# Patient Record
Sex: FEMALE | Race: WHITE | NOT HISPANIC OR LATINO
[De-identification: names, ages, dates, MRNs, and addresses within clinical notes are randomized per-mention and may not be internally consistent; named-entity substitution may affect disease eponyms.]

---

## 2020-06-18 ENCOUNTER — APPOINTMENT (OUTPATIENT)
Dept: GASTROENTEROLOGY | Facility: CLINIC | Age: 25
End: 2020-06-18
Payer: COMMERCIAL

## 2020-06-18 VITALS
BODY MASS INDEX: 19.13 KG/M2 | HEIGHT: 66 IN | TEMPERATURE: 98.7 F | HEART RATE: 74 BPM | OXYGEN SATURATION: 98 % | WEIGHT: 119 LBS | DIASTOLIC BLOOD PRESSURE: 70 MMHG | SYSTOLIC BLOOD PRESSURE: 110 MMHG

## 2020-06-18 DIAGNOSIS — Z83.79 FAMILY HISTORY OF OTHER DISEASES OF THE DIGESTIVE SYSTEM: ICD-10-CM

## 2020-06-18 DIAGNOSIS — Z80.0 FAMILY HISTORY OF MALIGNANT NEOPLASM OF DIGESTIVE ORGANS: ICD-10-CM

## 2020-06-18 DIAGNOSIS — Z83.42 FAMILY HISTORY OF FAMILIAL HYPERCHOLESTEROLEMIA: ICD-10-CM

## 2020-06-18 PROCEDURE — 99204 OFFICE O/P NEW MOD 45 MIN: CPT | Mod: 25

## 2020-06-18 PROCEDURE — 82270 OCCULT BLOOD FECES: CPT

## 2020-06-18 RX ORDER — BACILLUS COAGULANS/INULIN 1B-250 MG
CAPSULE ORAL
Refills: 0 | Status: ACTIVE | COMMUNITY

## 2020-06-18 NOTE — REASON FOR VISIT
[Initial Evaluation] : an initial evaluation [FreeTextEntry1] : Rectal bleeding, constipation, abdominal pain

## 2020-06-18 NOTE — PHYSICAL EXAM
[General Appearance - Alert] : alert [General Appearance - In No Acute Distress] : in no acute distress [Neck Cervical Mass (___cm)] : no neck mass was observed [Neck Appearance] : the appearance of the neck was normal [Jugular Venous Distention Increased] : there was no jugular-venous distention [Thyroid Diffuse Enlargement] : the thyroid was not enlarged [Thyroid Nodule] : there were no palpable thyroid nodules [Auscultation Breath Sounds / Voice Sounds] : lungs were clear to auscultation bilaterally [Heart Rate And Rhythm] : heart rate was normal and rhythm regular [Heart Sounds] : normal S1 and S2 [Heart Sounds Pericardial Friction Rub] : no pericardial rub [Heart Sounds Gallop] : no gallops [Murmurs] : no murmurs [Abdomen Tenderness] : non-tender [Bowel Sounds] : normal bowel sounds [Edema] : there was no peripheral edema [Abdomen Soft] : soft [] : no hepato-splenomegaly [Abdomen Mass (___ Cm)] : no abdominal mass palpated [Normal Sphincter Tone] : normal sphincter tone [No Rectal Mass] : no rectal mass [Occult Blood Positive] : stool positive for occult blood [No CVA Tenderness] : no ~M costovertebral angle tenderness [No Spinal Tenderness] : no spinal tenderness [Oriented To Time, Place, And Person] : oriented to person, place, and time [Impaired Insight] : insight and judgment were intact [Affect] : the affect was normal

## 2020-06-18 NOTE — HISTORY OF PRESENT ILLNESS
[FreeTextEntry1] : The patient is a 25-year-old woman who was well until 2 months ago, when she developed rectal bleeding and constipation.  She states that she began to go 1 to 2 days without a bowel movement and was straining to pass her stools.  She has noted red blood streaking on the stool and on the toilet paper.  She notes that this was during the COVID-19 isolation, when she had a significant change in her exercise and her diet.  She is a  and had significant reduction in her physical activity.  She also had been a vegan but began to eat fish and eggs.  Ever since then, the patient has had blood with every bowel movement.  The patient also notes that for the past month, she has had left-sided abdominal pain which is intermittent.  She denies heartburn, dysphasia, nausea, vomiting.  The patient's weight is stable.\par \par She was put on a stool softener and prune juice initially which did not help her.  She saw a gastroenterologist 1 week ago who recommended a high-fiber diet and placed her on Citrucel 2 tablespoons twice daily.  She has been moving her bowels 1-2 times a day but still had blood on all her bowel movements until today.  The patient's pain has lessened this week.\par \par  The patient has not been admitted to the hospital in the past year and denies any cardiac issues.

## 2020-06-18 NOTE — ASSESSMENT
[FreeTextEntry1] : Patient with new onset constipation and rectal bleeding with associated left-sided abdominal pain.  This appears to correspond to the patient's change in diet with less fiber and decreased physical activity.  The patient is concerned as she has colon cancer in her family.\par \par I advised the patient that she should continue Citrucel and follow a high-fiber diet.  She was also encouraged to increase her fluid intake.\par \par A colonoscopy has been scheduled. The risks, benefits, alternatives, and limitations of the procedure, including the possibility of missed lesions, were explained.

## 2020-06-18 NOTE — CONSULT LETTER
[FreeTextEntry1] : Dear Dr. Chrissie Jamison,\[facility] \[facility] I had the pleasure of seeing your patient ADARSH SAMUEL in the office today.  My office note is attached. PLEASE READ THE "ASSESSMENT" SECTION OF THE NOTE TO SEE MY IMPRESSION AND PLAN.\par \[facility] Thank you very much for allowing me to participate in the care of your patient.\[facility] \[facility] Sincerely,\[facility] \[facility] Ke Peguero M.D., FAC, [facility] Director, Celiac Program at [facility]  of Medicine\[facility] and [employer] at [facility] Practice Director,\[facility] - Gastroenterology/Internal Medicine at [address]\[place], [place] [address] Tel: [phone]\[facility] Email: [email or web address] \[facility] \[facility] The attached note has been created using a voice recognition system (Dragon).  There may be some misspellings and typos.  Please call my office if you have any issues or questions.

## 2020-07-04 ENCOUNTER — APPOINTMENT (OUTPATIENT)
Dept: DISASTER EMERGENCY | Facility: CLINIC | Age: 25
End: 2020-07-04

## 2020-07-04 LAB — SARS-COV-2 N GENE NPH QL NAA+PROBE: NOT DETECTED

## 2020-07-07 ENCOUNTER — APPOINTMENT (OUTPATIENT)
Dept: GASTROENTEROLOGY | Facility: AMBULATORY MEDICAL SERVICES | Age: 25
End: 2020-07-07
Payer: COMMERCIAL

## 2020-07-07 PROCEDURE — 45380 COLONOSCOPY AND BIOPSY: CPT

## 2020-08-20 ENCOUNTER — APPOINTMENT (OUTPATIENT)
Dept: GASTROENTEROLOGY | Facility: AMBULATORY MEDICAL SERVICES | Age: 25
End: 2020-08-20

## 2020-08-21 DIAGNOSIS — Z11.59 ENCOUNTER FOR SCREENING FOR OTHER VIRAL DISEASES: ICD-10-CM

## 2020-08-25 ENCOUNTER — APPOINTMENT (OUTPATIENT)
Dept: GASTROENTEROLOGY | Facility: CLINIC | Age: 25
End: 2020-08-25
Payer: COMMERCIAL

## 2020-08-25 VITALS
SYSTOLIC BLOOD PRESSURE: 110 MMHG | DIASTOLIC BLOOD PRESSURE: 68 MMHG | BODY MASS INDEX: 20.25 KG/M2 | HEIGHT: 66 IN | WEIGHT: 126 LBS | OXYGEN SATURATION: 98 % | HEART RATE: 71 BPM | TEMPERATURE: 98.4 F

## 2020-08-25 PROCEDURE — 36415 COLL VENOUS BLD VENIPUNCTURE: CPT

## 2020-08-25 PROCEDURE — 99214 OFFICE O/P EST MOD 30 MIN: CPT | Mod: 25

## 2020-08-25 RX ORDER — SODIUM PICOSULFATE, MAGNESIUM OXIDE, AND ANHYDROUS CITRIC ACID 10; 3.5; 12 MG/160ML; G/160ML; G/160ML
10-3.5-12 MG-GM LIQUID ORAL
Qty: 1 | Refills: 0 | Status: DISCONTINUED | COMMUNITY
Start: 2020-06-18 | End: 2020-08-25

## 2020-08-25 NOTE — PHYSICAL EXAM
[General Appearance - Alert] : alert [General Appearance - In No Acute Distress] : in no acute distress [Neck Appearance] : the appearance of the neck was normal [Thyroid Diffuse Enlargement] : the thyroid was not enlarged [Neck Cervical Mass (___cm)] : no neck mass was observed [Jugular Venous Distention Increased] : there was no jugular-venous distention [Thyroid Nodule] : there were no palpable thyroid nodules [Heart Rate And Rhythm] : heart rate was normal and rhythm regular [Auscultation Breath Sounds / Voice Sounds] : lungs were clear to auscultation bilaterally [Heart Sounds Gallop] : no gallops [Heart Sounds] : normal S1 and S2 [Murmurs] : no murmurs [Edema] : there was no peripheral edema [Heart Sounds Pericardial Friction Rub] : no pericardial rub [Abdomen Soft] : soft [Bowel Sounds] : normal bowel sounds [] : no hepato-splenomegaly [Abdomen Tenderness] : non-tender [Abdomen Mass (___ Cm)] : no abdominal mass palpated [No CVA Tenderness] : no ~M costovertebral angle tenderness [Oriented To Time, Place, And Person] : oriented to person, place, and time [No Spinal Tenderness] : no spinal tenderness [Affect] : the affect was normal [Impaired Insight] : insight and judgment were intact

## 2020-08-25 NOTE — HISTORY OF PRESENT ILLNESS
[FreeTextEntry1] : We reviewed the patient's colonoscopy performed on July 7, 2020.  Nonspecific punctate inflammation was seen in the right colon with biopsy showing acute and chronic inflammation, acute cryptitis, and crypt abscesses.  There was no evidence of chronicity and this appeared to be more consistent with an acute self-limited colitis than chronic inflammatory bowel disease.  The patient is feeling better with resolution of her abdominal pain.  She is having solid bowel movements 5-6 times a week.  She does see intermittent blood on the toilet paper.  She denies melena.\par \par \par Note from 6/18/2020 - The patient is a 25-year-old woman who was well until 2 months ago, when she developed rectal bleeding and constipation.  She states that she began to go 1 to 2 days without a bowel movement and was straining to pass her stools.  She has noted red blood streaking on the stool and on the toilet paper.  She notes that this was during the COVID-19 isolation, when she had a significant change in her exercise and her diet.  She is a  and had significant reduction in her physical activity.  She also had been a vegan but began to eat fish and eggs.  Ever since then, the patient has had blood with every bowel movement.  The patient also notes that for the past month, she has had left-sided abdominal pain which is intermittent.  She denies heartburn, dysphasia, nausea, vomiting.  The patient's weight is stable.\par \par She was put on a stool softener and prune juice initially which did not help her.  She saw a gastroenterologist 1 week ago who recommended a high-fiber diet and placed her on Citrucel 2 tablespoons twice daily.  She has been moving her bowels 1-2 times a day but still had blood on all her bowel movements until today.  The patient's pain has lessened this week.\par \par  The patient has not been admitted to the hospital in the past year and denies any cardiac issues.

## 2020-08-25 NOTE — ASSESSMENT
[FreeTextEntry1] : Patient with evidence of colitis in the right colon on colonoscopy.  Pathology was more consistent with an acute self-limited colitis rather than inflammatory bowel disease.  The patient's symptoms appear improved.\par \par We discussed the significance of these findings.  The patient's symptoms had lasted quite a while and the cause is unclear.\par \par Blood work was sent for CBC, CHEM pack, sed rate, C-reactive protein, and IBD serologies.\par \par We will repeat a colonoscopy in 1 year that is July 2021 2 rule out a chronic colitis.\par \par Patient was advised to use Citrucel daily and increase water intake to help with her bowel movements.\par \par She will call me if her symptoms change or worsen in any way.  Otherwise she will return to see me next year.\par \par \par Plan from 6/18/2020 - Patient with new onset constipation and rectal bleeding with associated left-sided abdominal pain.  This appears to correspond to the patient's change in diet with less fiber and decreased physical activity.  The patient is concerned as she has colon cancer in her family.\par \par I advised the patient that she should continue Citrucel and follow a high-fiber diet.  She was also encouraged to increase her fluid intake.\par \par A colonoscopy has been scheduled. The risks, benefits, alternatives, and limitations of the procedure, including the possibility of missed lesions, were explained.

## 2020-08-25 NOTE — CONSULT LETTER
[FreeTextEntry1] : Dear Dr. Chrissie Jamison,\[facility] \[facility] I had the pleasure of seeing your patient ADARSH SAMUEL in the office today.  My office note is attached. PLEASE READ THE "ASSESSMENT" SECTION OF THE NOTE TO SEE MY IMPRESSION AND PLAN.\par \[facility] Thank you very much for allowing me to participate in the care of your patient.\[facility] \[facility] Sincerely,\[facility] \[facility] Ke Peguero M.D., FAC, [facility] Director, Celiac Program at [facility]  of Medicine\[facility] and [employer] at [facility] Practice Director,\[facility] - Gastroenterology/Internal Medicine at [address]\[place], [place] [address] Tel: [phone]\[facility] Email: [email or web address] \[facility] \[facility] The attached note has been created using a voice recognition system (Dragon).  There may be some misspellings and typos.  Please call my office if you have any issues or questions.

## 2020-09-02 LAB
ALBUMIN SERPL ELPH-MCNC: 4.7 G/DL
ALP BLD-CCNC: 60 U/L
ALT SERPL-CCNC: 16 U/L
ANION GAP SERPL CALC-SCNC: 13 MMOL/L
ANTI-A4 FLA2 IGG ELISA: 24.3 EU/ML
ANTI-CBIR1 ELISA: 14.1 EU/ML
ANTI-FLAX IGG ELISA: 26.5 EU/ML
ANTI-OMPC IGA ELISA: < 3.1 EU/ML
ASCA IGA ELISA: < 3.1 EU/ML
ASCA IGG ELISA: < 3.1 EU/ML
AST SERPL-CCNC: 15 U/L
ATG16L1 SNP (RS2241880): NORMAL
BASOPHILS # BLD AUTO: 0.07 K/UL
BASOPHILS NFR BLD AUTO: 1.1 %
BILIRUB SERPL-MCNC: 0.4 MG/DL
BUN SERPL-MCNC: 12 MG/DL
CALCIUM SERPL-MCNC: 9.7 MG/DL
CHLORIDE SERPL-SCNC: 103 MMOL/L
CO2 SERPL-SCNC: 24 MMOL/L
CREAT SERPL-MCNC: 0.67 MG/DL
CRP PROMTHEUS: 0.5 MG/L
CRP SERPL-MCNC: <0.1 MG/DL
ECM1 SNP (RS3737240): NORMAL
EOSINOPHIL # BLD AUTO: 0.3 K/UL
EOSINOPHIL NFR BLD AUTO: 4.8 %
ERYTHROCYTE [SEDIMENTATION RATE] IN BLOOD BY WESTERGREN METHOD: 2 MM/HR
GLUCOSE SERPL-MCNC: 88 MG/DL
HCT VFR BLD CALC: 37.8 %
HGB BLD-MCNC: 11.9 G/DL
IBD AB 7 PNL SER: NORMAL
IBD-SPECIFIC PANCA IFA PATTERN DNASE SENSITIVITY: NOT DETECTED
IBD-SPECIFIC PANCA IFA PERINUCLEAR PATTERN: NOT DETECTED
ICAM-1 PROMETHEUS: 0.36 UG/ML
IMM GRANULOCYTES NFR BLD AUTO: 0.5 %
LYMPHOCYTES # BLD AUTO: 1.71 K/UL
LYMPHOCYTES NFR BLD AUTO: 27.2 %
MAN DIFF?: NORMAL
MCHC RBC-ENTMCNC: 28.9 PG
MCHC RBC-ENTMCNC: 31.5 GM/DL
MCV RBC AUTO: 91.7 FL
MONOCYTES # BLD AUTO: 0.42 K/UL
MONOCYTES NFR BLD AUTO: 6.7 %
NEUTROPHILS # BLD AUTO: 3.76 K/UL
NEUTROPHILS NFR BLD AUTO: 59.7 %
NKX2-3 SNP (RS10883365): NORMAL
PLATELET # BLD AUTO: 269 K/UL
POTASSIUM SERPL-SCNC: 4.3 MMOL/L
PROMETHEUS LABORATORY FOOTER: NORMAL
PROT SERPL-MCNC: 6.9 G/DL
RBC # BLD: 4.12 M/UL
RBC # FLD: 12.2 %
SAA PROMETHEUS: 1.2 MG/L
SODIUM SERPL-SCNC: 140 MMOL/L
STAT3 SNP (RS744166): NORMAL
VCAM-1 PROMETHEUS: 0.4 UG/ML
VEGF PROMETHEUS: 160 PG/ML
WBC # FLD AUTO: 6.29 K/UL

## 2020-10-21 ENCOUNTER — APPOINTMENT (OUTPATIENT)
Dept: GASTROENTEROLOGY | Facility: CLINIC | Age: 25
End: 2020-10-21
Payer: COMMERCIAL

## 2020-10-21 VITALS
HEIGHT: 66 IN | OXYGEN SATURATION: 96 % | SYSTOLIC BLOOD PRESSURE: 120 MMHG | WEIGHT: 130 LBS | HEART RATE: 89 BPM | TEMPERATURE: 98.6 F | DIASTOLIC BLOOD PRESSURE: 68 MMHG | BODY MASS INDEX: 20.89 KG/M2

## 2020-10-21 PROCEDURE — 99215 OFFICE O/P EST HI 40 MIN: CPT | Mod: 25

## 2020-10-21 PROCEDURE — 36415 COLL VENOUS BLD VENIPUNCTURE: CPT

## 2020-10-21 PROCEDURE — 82270 OCCULT BLOOD FECES: CPT

## 2020-10-21 PROCEDURE — 99072 ADDL SUPL MATRL&STAF TM PHE: CPT

## 2020-10-22 NOTE — HISTORY OF PRESENT ILLNESS
[FreeTextEntry1] : The patient was last seen on August 25 at which time her symptoms had resolved.  She had had findings of acute colitis in the right colon.  Blood work at that time was normal.  She was well until 1-1/2 to 2 weeks ago when she states that her symptoms have started to return.  She now has mild left-sided abdominal pain. She has 1 solid bowel movement a day and does see some blood on the toilet paper.  She denies melena.  She denies heartburn or dysphagia.  She has also recently developed bloating and nausea after eating.  She has had mild dysuria but denies hematuria.  She denies rectal pain, burning, itching.\par \par The patient now tells me that she had abdominal and pelvic transvaginal ultrasound on June 5, 2020.  I reviewed the reports.  The abdominal ultrasound was normal although the gallbladder was under distended and therefore evaluation was limited.  The pelvic ultrasound was significant for a mild to moderate amount of fluid in the left adnexal region which could be due to a recently ruptured cyst.\par \par \par Note from 8/25/2020 - We reviewed the patient's colonoscopy performed on July 7, 2020.  Nonspecific punctate inflammation was seen in the right colon with biopsy showing acute and chronic inflammation, acute cryptitis, and crypt abscesses.  There was no evidence of chronicity and this appeared to be more consistent with an acute self-limited colitis than chronic inflammatory bowel disease.  The patient is feeling better with resolution of her abdominal pain.  She is having solid bowel movements 5-6 times a week.  She does see intermittent blood on the toilet paper.  She denies melena.\par \par \par Note from 6/18/2020 - The patient is a 25-year-old woman who was well until 2 months ago, when she developed rectal bleeding and constipation.  She states that she began to go 1 to 2 days without a bowel movement and was straining to pass her stools.  She has noted red blood streaking on the stool and on the toilet paper.  She notes that this was during the COVID-19 isolation, when she had a significant change in her exercise and her diet.  She is a  and had significant reduction in her physical activity.  She also had been a vegan but began to eat fish and eggs.  Ever since then, the patient has had blood with every bowel movement.  The patient also notes that for the past month, she has had left-sided abdominal pain which is intermittent.  She denies heartburn, dysphasia, nausea, vomiting.  The patient's weight is stable.\par \par She was put on a stool softener and prune juice initially which did not help her.  She saw a gastroenterologist 1 week ago who recommended a high-fiber diet and placed her on Citrucel 2 tablespoons twice daily.  She has been moving her bowels 1-2 times a day but still had blood on all her bowel movements until today.  The patient's pain has lessened this week.\par \par  The patient has not been admitted to the hospital in the past year and denies any cardiac issues.

## 2020-10-22 NOTE — ASSESSMENT
[FreeTextEntry1] : Patient with symptoms of left-sided abdominal pain, postprandial bloating and nausea, and mild rectal bleeding.  An anal fissure was found on exam.  The patient did have evidence of acute right sided colitis on previous colonoscopy but this does not seem to be the cause of the patient's symptoms.  The patient appears to have had a ruptured ovarian cyst back in June and it is possible that this is happening again.\par \par Patient was sent for repeat abdominal and transvaginal pelvic ultrasound.\par \par Blood work was sent for CBC, celiac markers, hCG.\par \par Urine was sent for UA and C&S.\par \par Patient was advised to use Citrucel daily to help with the passage of bowels and healing of the anal fissure.\par \par Patient will return to see me in 2 weeks.  If she has ongoing bloating and nausea, she may need EGD.\par \par The plan is to repeat colonoscopy in July 2021.\par \par \par Plan from 8/25/2020 - Patient with evidence of colitis in the right colon on colonoscopy.  Pathology was more consistent with an acute self-limited colitis rather than inflammatory bowel disease.  The patient's symptoms appear improved.\par \par We discussed the significance of these findings.  The patient's symptoms had lasted quite a while and the cause is unclear.\par \par Blood work was sent for CBC, CHEM pack, sed rate, C-reactive protein, and IBD serologies.\par \par We will repeat a colonoscopy in 1 year that is July 2021 2 rule out a chronic colitis.\par \par Patient was advised to use Citrucel daily and increase water intake to help with her bowel movements.\par \par She will call me if her symptoms change or worsen in any way.  Otherwise she will return to see me next year.\par \par \par Plan from 6/18/2020 - Patient with new onset constipation and rectal bleeding with associated left-sided abdominal pain.  This appears to correspond to the patient's change in diet with less fiber and decreased physical activity.  The patient is concerned as she has colon cancer in her family.\par \par I advised the patient that she should continue Citrucel and follow a high-fiber diet.  She was also encouraged to increase her fluid intake.\par \par A colonoscopy has been scheduled. The risks, benefits, alternatives, and limitations of the procedure, including the possibility of missed lesions, were explained.

## 2020-10-22 NOTE — PHYSICAL EXAM
[General Appearance - Alert] : alert [General Appearance - In No Acute Distress] : in no acute distress [Neck Appearance] : the appearance of the neck was normal [Neck Cervical Mass (___cm)] : no neck mass was observed [Jugular Venous Distention Increased] : there was no jugular-venous distention [Thyroid Diffuse Enlargement] : the thyroid was not enlarged [Thyroid Nodule] : there were no palpable thyroid nodules [Auscultation Breath Sounds / Voice Sounds] : lungs were clear to auscultation bilaterally [Heart Rate And Rhythm] : heart rate was normal and rhythm regular [Heart Sounds] : normal S1 and S2 [Heart Sounds Gallop] : no gallops [Murmurs] : no murmurs [Heart Sounds Pericardial Friction Rub] : no pericardial rub [Edema] : there was no peripheral edema [Bowel Sounds] : normal bowel sounds [Abdomen Soft] : soft [Abdomen Tenderness] : non-tender [] : no hepato-splenomegaly [Abdomen Mass (___ Cm)] : no abdominal mass palpated [No CVA Tenderness] : no ~M costovertebral angle tenderness [No Spinal Tenderness] : no spinal tenderness [Oriented To Time, Place, And Person] : oriented to person, place, and time [Impaired Insight] : insight and judgment were intact [Affect] : the affect was normal [Normal Sphincter Tone] : normal sphincter tone [No Rectal Mass] : no rectal mass [Occult Blood Positive] : stool positive for occult blood [FreeTextEntry1] : small anal fissure, blood seen

## 2020-10-22 NOTE — CONSULT LETTER
[FreeTextEntry1] : Dear Dr. Chrissie Jamison,\[facility] \[facility] I had the pleasure of seeing your patient ADARSH SAMUEL in the office today.  My office note is attached. PLEASE READ THE "ASSESSMENT" SECTION OF THE NOTE TO SEE MY IMPRESSION AND PLAN.\par \[facility] Thank you very much for allowing me to participate in the care of your patient.\[facility] \[facility] Sincerely,\[facility] \[facility] Ke Peguero M.D., FAC, [facility] Director, Celiac Program at [facility]  of Medicine\[facility] and [employer] at [facility] Practice Director,\[facility] - Gastroenterology/Internal Medicine at [address]\[place], [place] [address] Tel: [phone]\[facility] Email: [email or web address] \[facility] \[facility] The attached note has been created using a voice recognition system (Dragon).  There may be some misspellings and typos.  Please call my office if you have any issues or questions.

## 2020-10-24 LAB
APPEARANCE: CLEAR
BACTERIA UR CULT: NORMAL
BASOPHILS # BLD AUTO: 0.05 K/UL
BASOPHILS NFR BLD AUTO: 0.9 %
BILIRUBIN URINE: NEGATIVE
BLOOD URINE: NEGATIVE
COLOR: NORMAL
ENDOMYSIUM IGA SER QL: NEGATIVE
ENDOMYSIUM IGA TITR SER: NORMAL
EOSINOPHIL # BLD AUTO: 0.41 K/UL
EOSINOPHIL NFR BLD AUTO: 7.2 %
GLIADIN IGA SER QL: <5 UNITS
GLIADIN IGG SER QL: <5 UNITS
GLIADIN PEPTIDE IGA SER-ACNC: NEGATIVE
GLIADIN PEPTIDE IGG SER-ACNC: NEGATIVE
GLUCOSE QUALITATIVE U: NEGATIVE
HCG SERPL-MCNC: <1 MIU/ML
HCT VFR BLD CALC: 38.8 %
HGB BLD-MCNC: 12.6 G/DL
IGA SER QL IEP: 157 MG/DL
IMM GRANULOCYTES NFR BLD AUTO: 0.7 %
KETONES URINE: NEGATIVE
LEUKOCYTE ESTERASE URINE: NEGATIVE
LYMPHOCYTES # BLD AUTO: 1.31 K/UL
LYMPHOCYTES NFR BLD AUTO: 22.9 %
MAN DIFF?: NORMAL
MCHC RBC-ENTMCNC: 29.5 PG
MCHC RBC-ENTMCNC: 32.5 GM/DL
MCV RBC AUTO: 90.9 FL
MONOCYTES # BLD AUTO: 0.36 K/UL
MONOCYTES NFR BLD AUTO: 6.3 %
NEUTROPHILS # BLD AUTO: 3.56 K/UL
NEUTROPHILS NFR BLD AUTO: 62 %
NITRITE URINE: NEGATIVE
PH URINE: 8
PLATELET # BLD AUTO: 256 K/UL
PROTEIN URINE: NORMAL
RBC # BLD: 4.27 M/UL
RBC # FLD: 12.6 %
SPECIFIC GRAVITY URINE: 1.02
TTG IGA SER IA-ACNC: <1.2 U/ML
TTG IGA SER-ACNC: NEGATIVE
TTG IGG SER IA-ACNC: 1.6 U/ML
TTG IGG SER IA-ACNC: NEGATIVE
UROBILINOGEN URINE: NORMAL
WBC # FLD AUTO: 5.73 K/UL

## 2020-11-04 ENCOUNTER — APPOINTMENT (OUTPATIENT)
Dept: GASTROENTEROLOGY | Facility: CLINIC | Age: 25
End: 2020-11-04
Payer: COMMERCIAL

## 2020-11-04 VITALS
BODY MASS INDEX: 20.89 KG/M2 | DIASTOLIC BLOOD PRESSURE: 70 MMHG | HEIGHT: 66 IN | SYSTOLIC BLOOD PRESSURE: 110 MMHG | TEMPERATURE: 98.6 F | WEIGHT: 130 LBS

## 2020-11-04 DIAGNOSIS — K60.2 ANAL FISSURE, UNSPECIFIED: ICD-10-CM

## 2020-11-04 PROCEDURE — 99072 ADDL SUPL MATRL&STAF TM PHE: CPT

## 2020-11-04 PROCEDURE — 99214 OFFICE O/P EST MOD 30 MIN: CPT

## 2020-11-05 PROBLEM — K60.2 ANAL FISSURE: Status: ACTIVE | Noted: 2020-10-22

## 2020-11-05 NOTE — CONSULT LETTER
[FreeTextEntry1] : Dear Dr. Chrissie Jamison,\[facility] \[facility] I had the pleasure of seeing your patient ADARSH SAMUEL in the office today.  My office note is attached. PLEASE READ THE "ASSESSMENT" SECTION OF THE NOTE TO SEE MY IMPRESSION AND PLAN.\par \[facility] Thank you very much for allowing me to participate in the care of your patient.\[facility] \[facility] Sincerely,\[facility] \[facility] Ke Peguero M.D., FAC, [facility] Director, Celiac Program at [facility]  of Medicine\[facility] and [employer] at [facility] Practice Director,\[facility] - Gastroenterology/Internal Medicine at [address]\[place], [place] [address] Tel: [phone]\[facility] Email: [email or web address] \[facility] \[facility] The attached note has been created using a voice recognition system (Dragon).  There may be some misspellings and typos.  Please call my office if you have any issues or questions.

## 2020-11-05 NOTE — HISTORY OF PRESENT ILLNESS
[FreeTextEntry1] : Patient is feeling somewhat better.  She has been using Citrucel daily and is moving her bowels every 1 to 2 days, sometimes straining.  She sees bright red blood on the toilet paper only with harder stools.  She denies melena.  She notes that she has less left lower quadrant abdominal pain although it has not resolved.  She also notes that bloating and nausea have improved.  Blood work and urine testing from her last visit on October 21, 2020 were normal.  The patient has not yet had the abdominal and transvaginal ultrasound, which is scheduled for next week.  She has a history of right-sided colitis in the colon and was found to have an anal fissure at her last visit.\par \par \par Note from 10/21/2020 - The patient was last seen on August 25 at which time her symptoms had resolved.  She had had findings of acute colitis in the right colon.  Blood work at that time was normal.  She was well until 1-1/2 to 2 weeks ago when she states that her symptoms have started to return.  She now has mild left-sided abdominal pain. She has 1 solid bowel movement a day and does see some blood on the toilet paper.  She denies melena.  She denies heartburn or dysphagia.  She has also recently developed bloating and nausea after eating.  She has had mild dysuria but denies hematuria.  She denies rectal pain, burning, itching.\par \par The patient now tells me that she had abdominal and pelvic transvaginal ultrasound on June 5, 2020.  I reviewed the reports.  The abdominal ultrasound was normal although the gallbladder was under distended and therefore evaluation was limited.  The pelvic ultrasound was significant for a mild to moderate amount of fluid in the left adnexal region which could be due to a recently ruptured cyst.\par \par \par Note from 8/25/2020 - We reviewed the patient's colonoscopy performed on July 7, 2020.  Nonspecific punctate inflammation was seen in the right colon with biopsy showing acute and chronic inflammation, acute cryptitis, and crypt abscesses.  There was no evidence of chronicity and this appeared to be more consistent with an acute self-limited colitis than chronic inflammatory bowel disease.  The patient is feeling better with resolution of her abdominal pain.  She is having solid bowel movements 5-6 times a week.  She does see intermittent blood on the toilet paper.  She denies melena.\par \par \par Note from 6/18/2020 - The patient is a 25-year-old woman who was well until 2 months ago, when she developed rectal bleeding and constipation.  She states that she began to go 1 to 2 days without a bowel movement and was straining to pass her stools.  She has noted red blood streaking on the stool and on the toilet paper.  She notes that this was during the COVID-19 isolation, when she had a significant change in her exercise and her diet.  She is a  and had significant reduction in her physical activity.  She also had been a vegan but began to eat fish and eggs.  Ever since then, the patient has had blood with every bowel movement.  The patient also notes that for the past month, she has had left-sided abdominal pain which is intermittent.  She denies heartburn, dysphasia, nausea, vomiting.  The patient's weight is stable.\par \par She was put on a stool softener and prune juice initially which did not help her.  She saw a gastroenterologist 1 week ago who recommended a high-fiber diet and placed her on Citrucel 2 tablespoons twice daily.  She has been moving her bowels 1-2 times a day but still had blood on all her bowel movements until today.  The patient's pain has lessened this week.\par \par  The patient has not been admitted to the hospital in the past year and denies any cardiac issues.

## 2020-11-06 ENCOUNTER — TRANSCRIPTION ENCOUNTER (OUTPATIENT)
Age: 25
End: 2020-11-06

## 2020-12-01 ENCOUNTER — NON-APPOINTMENT (OUTPATIENT)
Age: 25
End: 2020-12-01

## 2020-12-02 ENCOUNTER — APPOINTMENT (OUTPATIENT)
Dept: GASTROENTEROLOGY | Facility: CLINIC | Age: 25
End: 2020-12-02
Payer: COMMERCIAL

## 2020-12-02 VITALS
BODY MASS INDEX: 20.89 KG/M2 | HEIGHT: 66 IN | TEMPERATURE: 97.7 F | DIASTOLIC BLOOD PRESSURE: 70 MMHG | WEIGHT: 130 LBS | OXYGEN SATURATION: 98 % | SYSTOLIC BLOOD PRESSURE: 100 MMHG | HEART RATE: 86 BPM

## 2020-12-02 PROCEDURE — 99214 OFFICE O/P EST MOD 30 MIN: CPT

## 2020-12-02 PROCEDURE — 99072 ADDL SUPL MATRL&STAF TM PHE: CPT

## 2020-12-03 NOTE — HISTORY OF PRESENT ILLNESS
[FreeTextEntry1] : We reviewed the patient's ultrasound of the abdomen and pelvis performed on November 27, 2020.  The pelvic ultrasound revealed a left ovarian corpus luteal cyst and a tiny echogenic focus in the left ovary that was too small to characterize.  The patient continues to have intermittent left-sided abdominal pain which was worse 2 weeks ago.  She had been taking Citrucel daily but stopped this and states that her stools are now "bad".  She moves her bowels every 2 to 3 days and strains.  She is seeing blood with every bowel movement on the toilet paper and a little bit in the bowl.  She denies melena.  She denies fevers.\par \par \par Note from 11/4/2020 - Patient is feeling somewhat better.  She has been using Citrucel daily and is moving her bowels every 1 to 2 days, sometimes straining.  She sees bright red blood on the toilet paper only with harder stools.  She denies melena.  She notes that she has less left lower quadrant abdominal pain although it has not resolved.  She also notes that bloating and nausea have improved.  Blood work and urine testing from her last visit on October 21, 2020 were normal.  The patient has not yet had the abdominal and transvaginal ultrasound, which is scheduled for next week.  She has a history of right-sided colitis in the colon and was found to have an anal fissure at her last visit.\par \par \par Note from 10/21/2020 - The patient was last seen on August 25 at which time her symptoms had resolved.  She had had findings of acute colitis in the right colon.  Blood work at that time was normal.  She was well until 1-1/2 to 2 weeks ago when she states that her symptoms have started to return.  She now has mild left-sided abdominal pain. She has 1 solid bowel movement a day and does see some blood on the toilet paper.  She denies melena.  She denies heartburn or dysphagia.  She has also recently developed bloating and nausea after eating.  She has had mild dysuria but denies hematuria.  She denies rectal pain, burning, itching.\par \par The patient now tells me that she had abdominal and pelvic transvaginal ultrasound on June 5, 2020.  I reviewed the reports.  The abdominal ultrasound was normal although the gallbladder was under distended and therefore evaluation was limited.  The pelvic ultrasound was significant for a mild to moderate amount of fluid in the left adnexal region which could be due to a recently ruptured cyst.\par \par \par Note from 8/25/2020 - We reviewed the patient's colonoscopy performed on July 7, 2020.  Nonspecific punctate inflammation was seen in the right colon with biopsy showing acute and chronic inflammation, acute cryptitis, and crypt abscesses.  There was no evidence of chronicity and this appeared to be more consistent with an acute self-limited colitis than chronic inflammatory bowel disease.  The patient is feeling better with resolution of her abdominal pain.  She is having solid bowel movements 5-6 times a week.  She does see intermittent blood on the toilet paper.  She denies melena.\par \par \par Note from 6/18/2020 - The patient is a 25-year-old woman who was well until 2 months ago, when she developed rectal bleeding and constipation.  She states that she began to go 1 to 2 days without a bowel movement and was straining to pass her stools.  She has noted red blood streaking on the stool and on the toilet paper.  She notes that this was during the COVID-19 isolation, when she had a significant change in her exercise and her diet.  She is a  and had significant reduction in her physical activity.  She also had been a vegan but began to eat fish and eggs.  Ever since then, the patient has had blood with every bowel movement.  The patient also notes that for the past month, she has had left-sided abdominal pain which is intermittent.  She denies heartburn, dysphasia, nausea, vomiting.  The patient's weight is stable.\par \par She was put on a stool softener and prune juice initially which did not help her.  She saw a gastroenterologist 1 week ago who recommended a high-fiber diet and placed her on Citrucel 2 tablespoons twice daily.  She has been moving her bowels 1-2 times a day but still had blood on all her bowel movements until today.  The patient's pain has lessened this week.\par \par  The patient has not been admitted to the hospital in the past year and denies any cardiac issues.

## 2020-12-03 NOTE — PHYSICAL EXAM
[General Appearance - Alert] : alert [General Appearance - In No Acute Distress] : in no acute distress [Neck Appearance] : the appearance of the neck was normal [Neck Cervical Mass (___cm)] : no neck mass was observed [Jugular Venous Distention Increased] : there was no jugular-venous distention [Thyroid Diffuse Enlargement] : the thyroid was not enlarged [Thyroid Nodule] : there were no palpable thyroid nodules [Auscultation Breath Sounds / Voice Sounds] : lungs were clear to auscultation bilaterally [Heart Rate And Rhythm] : heart rate was normal and rhythm regular [Heart Sounds] : normal S1 and S2 [Heart Sounds Gallop] : no gallops [Murmurs] : no murmurs [Heart Sounds Pericardial Friction Rub] : no pericardial rub [Edema] : there was no peripheral edema [Bowel Sounds] : normal bowel sounds [Abdomen Soft] : soft [] : no hepato-splenomegaly [Abdomen Mass (___ Cm)] : no abdominal mass palpated [No CVA Tenderness] : no ~M costovertebral angle tenderness [No Spinal Tenderness] : no spinal tenderness [Oriented To Time, Place, And Person] : oriented to person, place, and time [Impaired Insight] : insight and judgment were intact [Affect] : the affect was normal [FreeTextEntry1] : mild LLQ tenderness

## 2020-12-03 NOTE — ASSESSMENT
[FreeTextEntry1] : Patient with ongoing complaints of left-sided abdominal pain.  She is mildly tender in the left lower quadrant.  Abdominal ultrasound was normal but pelvic ultrasound showed a left ovarian corpus luteal cyst and a very tiny focus in the left ovary that was too small to characterize.\par \par Patient was advised to follow-up with her gynecologist regarding the ovarian findings.\par \par Patient was sent for CT scan of the abdomen and pelvis given her ongoing pain and tenderness.  We need to rule out diverticulitis and colitis, especially as the patient had evidence of right-sided colitis on colonoscopy.\par \par Patient was advised that she should be using the Citrucel daily and drinking at least 64 ounces of water a day to keep her bowel movements more regular, which could potentially help her pain.\par \par We will repeat a colonoscopy in July 2021 to follow-up the finding of colitis to see if this is a chronic condition.\par \par \par Plan from 11/4/2020 - Patient with right-sided colitis on colonoscopy who notes improvement but not resolution of her left lower quadrant pain and rectal bleeding.  Her bloating and nausea have improved.  She had an anal fissure with a last visit 2 weeks ago.\par \par Patient was advised to continue using Citrucel daily.  She admits to not drinking enough water and was advised to drink at least 64 ounces of water a day.\par \par Patient will proceed with abdominal and transvaginal ultrasound, scheduled for next week.\par \par We will observe her symptoms.  The patient will return to see me in 1 month but will call me if anything worsens in the interim.\par \par We will repeat a colonoscopy in July 2021.\par \par \par Plan from 10/21/2020 - Patient with symptoms of left-sided abdominal pain, postprandial bloating and nausea, and mild rectal bleeding.  An anal fissure was found on exam.  The patient did have evidence of acute right sided colitis on previous colonoscopy but this does not seem to be the cause of the patient's symptoms.  The patient appears to have had a ruptured ovarian cyst back in June and it is possible that this is happening again.\par \par Patient was sent for repeat abdominal and transvaginal pelvic ultrasound.\par \par Blood work was sent for CBC, celiac markers, hCG.\par \par Urine was sent for UA and C&S.\par \par Patient was advised to use Citrucel daily to help with the passage of bowels and healing of the anal fissure.\par \par Patient will return to see me in 2 weeks.  If she has ongoing bloating and nausea, she may need EGD.\par \par The plan is to repeat colonoscopy in July 2021.\par \par \par Plan from 8/25/2020 - Patient with evidence of colitis in the right colon on colonoscopy.  Pathology was more consistent with an acute self-limited colitis rather than inflammatory bowel disease.  The patient's symptoms appear improved.\par \par We discussed the significance of these findings.  The patient's symptoms had lasted quite a while and the cause is unclear.\par \par Blood work was sent for CBC, CHEM pack, sed rate, C-reactive protein, and IBD serologies.\par \par We will repeat a colonoscopy in 1 year that is July 2021 2 rule out a chronic colitis.\par \par Patient was advised to use Citrucel daily and increase water intake to help with her bowel movements.\par \par She will call me if her symptoms change or worsen in any way.  Otherwise she will return to see me next year.\par \par \par Plan from 6/18/2020 - Patient with new onset constipation and rectal bleeding with associated left-sided abdominal pain.  This appears to correspond to the patient's change in diet with less fiber and decreased physical activity.  The patient is concerned as she has colon cancer in her family.\par \par I advised the patient that she should continue Citrucel and follow a high-fiber diet.  She was also encouraged to increase her fluid intake.\par \par A colonoscopy has been scheduled. The risks, benefits, alternatives, and limitations of the procedure, including the possibility of missed lesions, were explained.

## 2020-12-15 ENCOUNTER — APPOINTMENT (OUTPATIENT)
Dept: CT IMAGING | Facility: CLINIC | Age: 25
End: 2020-12-15
Payer: COMMERCIAL

## 2020-12-15 ENCOUNTER — OUTPATIENT (OUTPATIENT)
Dept: OUTPATIENT SERVICES | Facility: HOSPITAL | Age: 25
LOS: 1 days | End: 2020-12-15
Payer: COMMERCIAL

## 2020-12-15 DIAGNOSIS — R10.9 UNSPECIFIED ABDOMINAL PAIN: ICD-10-CM

## 2020-12-15 PROCEDURE — 74177 CT ABD & PELVIS W/CONTRAST: CPT

## 2020-12-15 PROCEDURE — 74177 CT ABD & PELVIS W/CONTRAST: CPT | Mod: 26

## 2020-12-22 ENCOUNTER — NON-APPOINTMENT (OUTPATIENT)
Age: 25
End: 2020-12-22

## 2022-01-10 NOTE — ASSESSMENT
Brief Postoperative Note      Patient: Verta Skiff  YOB: 1943  MRN: [id number]    Date of Procedure: 1/10/2022    Pre-Op Diagnosis: Polyp of gallbladder [K82.4]    Post-Op Diagnosis: Same       Procedure(s):  LAPAROSCOPIC CHOLECYSTECTOMY    Surgeon(s):  Caren Smith MD    Assistant:  Surgical Assistant: Tina Panda    Anesthesia: General    Estimated Blood Loss (mL): Minimal    Complications: None    Specimens:   ID Type Source Tests Collected by Time Destination   A : gallbladder  Specimen Abdomen SURGICAL PATHOLOGY Caren Smith MD 1/10/2022 1254        Implants:  * No implants in log *      Drains: * No LDAs found *    Findings: as above    Electronically signed by Eliseo Simmons MD on 1/10/2022 at 1:10 PM [FreeTextEntry1] : Patient with right-sided colitis on colonoscopy who notes improvement but not resolution of her left lower quadrant pain and rectal bleeding.  Her bloating and nausea have improved.  She had an anal fissure with a last visit 2 weeks ago.\par \par Patient was advised to continue using Citrucel daily.  She admits to not drinking enough water and was advised to drink at least 64 ounces of water a day.\par \par Patient will proceed with abdominal and transvaginal ultrasound, scheduled for next week.\par \par We will observe her symptoms.  The patient will return to see me in 1 month but will call me if anything worsens in the interim.\par \par We will repeat a colonoscopy in July 2021.\par \par \par Plan from 10/21/2020 - Patient with symptoms of left-sided abdominal pain, postprandial bloating and nausea, and mild rectal bleeding.  An anal fissure was found on exam.  The patient did have evidence of acute right sided colitis on previous colonoscopy but this does not seem to be the cause of the patient's symptoms.  The patient appears to have had a ruptured ovarian cyst back in June and it is possible that this is happening again.\par \par Patient was sent for repeat abdominal and transvaginal pelvic ultrasound.\par \par Blood work was sent for CBC, celiac markers, hCG.\par \par Urine was sent for UA and C&S.\par \par Patient was advised to use Citrucel daily to help with the passage of bowels and healing of the anal fissure.\par \par Patient will return to see me in 2 weeks.  If she has ongoing bloating and nausea, she may need EGD.\par \par The plan is to repeat colonoscopy in July 2021.\par \par \par Plan from 8/25/2020 - Patient with evidence of colitis in the right colon on colonoscopy.  Pathology was more consistent with an acute self-limited colitis rather than inflammatory bowel disease.  The patient's symptoms appear improved.\par \par We discussed the significance of these findings.  The patient's symptoms had lasted quite a while and the cause is unclear.\par \par Blood work was sent for CBC, CHEM pack, sed rate, C-reactive protein, and IBD serologies.\par \par We will repeat a colonoscopy in 1 year that is July 2021 2 rule out a chronic colitis.\par \par Patient was advised to use Citrucel daily and increase water intake to help with her bowel movements.\par \par She will call me if her symptoms change or worsen in any way.  Otherwise she will return to see me next year.\par \par \par Plan from 6/18/2020 - Patient with new onset constipation and rectal bleeding with associated left-sided abdominal pain.  This appears to correspond to the patient's change in diet with less fiber and decreased physical activity.  The patient is concerned as she has colon cancer in her family.\par \par I advised the patient that she should continue Citrucel and follow a high-fiber diet.  She was also encouraged to increase her fluid intake.\par \par A colonoscopy has been scheduled. The risks, benefits, alternatives, and limitations of the procedure, including the possibility of missed lesions, were explained.

## 2022-09-21 ENCOUNTER — APPOINTMENT (OUTPATIENT)
Dept: GASTROENTEROLOGY | Facility: CLINIC | Age: 27
End: 2022-09-21

## 2022-10-07 ENCOUNTER — APPOINTMENT (OUTPATIENT)
Dept: GASTROENTEROLOGY | Facility: CLINIC | Age: 27
End: 2022-10-07

## 2022-10-13 ENCOUNTER — NON-APPOINTMENT (OUTPATIENT)
Age: 27
End: 2022-10-13

## 2022-10-18 ENCOUNTER — APPOINTMENT (OUTPATIENT)
Dept: GASTROENTEROLOGY | Facility: CLINIC | Age: 27
End: 2022-10-18

## 2022-10-18 VITALS
SYSTOLIC BLOOD PRESSURE: 105 MMHG | TEMPERATURE: 97.5 F | WEIGHT: 138 LBS | DIASTOLIC BLOOD PRESSURE: 70 MMHG | HEIGHT: 66 IN | BODY MASS INDEX: 22.18 KG/M2 | HEART RATE: 68 BPM | OXYGEN SATURATION: 99 %

## 2022-10-18 DIAGNOSIS — K62.89 OTHER SPECIFIED DISEASES OF ANUS AND RECTUM: ICD-10-CM

## 2022-10-18 DIAGNOSIS — K52.9 NONINFECTIVE GASTROENTERITIS AND COLITIS, UNSPECIFIED: ICD-10-CM

## 2022-10-18 PROCEDURE — 36415 COLL VENOUS BLD VENIPUNCTURE: CPT

## 2022-10-18 PROCEDURE — 99214 OFFICE O/P EST MOD 30 MIN: CPT | Mod: 25

## 2022-10-19 NOTE — CONSULT LETTER
[FreeTextEntry1] : Dear Dr. Jeanie Jones,\par \par I had the pleasure of seeing your patient ADARSH SAMUEL in the office today.  My office note is attached. PLEASE READ THE "ASSESSMENT" SECTION OF THE NOTE TO SEE MY IMPRESSION AND PLAN.\par \par Thank you very much for allowing me to participate in the care of your patient.\par \par Sincerely,\par \par Ke Peguero M.D., FACG, FACP\par Director, Celiac Program at [facility]\par  of Medicine, [employer] at [facility] Adjunct  of Medicine, [facility] Practice Director, [facility] - Gastroenterology at [place]\[facility] [address]\[place] [address]\par Tel: [phone]\par Email: [email or web address]\par \par \par The attached note has been created using a voice recognition system (Dragon).  There may be some misspellings and typos.  Please call my office if you have any issues or questions.

## 2022-10-19 NOTE — HISTORY OF PRESENT ILLNESS
[FreeTextEntry1] : The patient is seen for the first time since her last visit on December 2, 2020.  At that time, she had had mild right-sided colitis on colonoscopy.  The biopsies appear to be more likely acute will go chronicity needed to be evaluated.  She had a CT scan on December 15, 2020 which was negative other than a small left ovarian cyst.  She saw another gastroenterologist 1 year ago with rectal bleeding.  She saw Dr. Ramirez who reportedly performed a sigmoidoscopy which showed proctitis.  The patient was given mesalamine suppositories which she uses periodically.  She gets "flareups" where she has bright red blood on the toilet paper.  Her bowel movements go from a baseline of 1 solid bowel movement a day to incomplete bowel movements every other day.  She has left-sided pain and tightness during the flareups.  She denies melena.  These events occur a couple of times a year but last weeks at a time.  She has currently been having bleeding on the toilet paper for the past 2 weeks.  She denies diarrhea, heartburn, dysphagia.  The patient's weight is stable.  The patient has not been admitted to the hospital in the past year and denies any cardiac issues.\par \par \par Note from 12/2/2020 - We reviewed the patient's ultrasound of the abdomen and pelvis performed on November 27, 2020.  The pelvic ultrasound revealed a left ovarian corpus luteal cyst and a tiny echogenic focus in the left ovary that was too small to characterize.  The patient continues to have intermittent left-sided abdominal pain which was worse 2 weeks ago.  She had been taking Citrucel daily but stopped this and states that her stools are now "bad".  She moves her bowels every 2 to 3 days and strains.  She is seeing blood with every bowel movement on the toilet paper and a little bit in the bowl.  She denies melena.  She denies fevers.\par \par \par Note from 11/4/2020 - Patient is feeling somewhat better.  She has been using Citrucel daily and is moving her bowels every 1 to 2 days, sometimes straining.  She sees bright red blood on the toilet paper only with harder stools.  She denies melena.  She notes that she has less left lower quadrant abdominal pain although it has not resolved.  She also notes that bloating and nausea have improved.  Blood work and urine testing from her last visit on October 21, 2020 were normal.  The patient has not yet had the abdominal and transvaginal ultrasound, which is scheduled for next week.  She has a history of right-sided colitis in the colon and was found to have an anal fissure at her last visit.\par \par \par Note from 10/21/2020 - The patient was last seen on August 25 at which time her symptoms had resolved.  She had had findings of acute colitis in the right colon.  Blood work at that time was normal.  She was well until 1-1/2 to 2 weeks ago when she states that her symptoms have started to return.  She now has mild left-sided abdominal pain. She has 1 solid bowel movement a day and does see some blood on the toilet paper.  She denies melena.  She denies heartburn or dysphagia.  She has also recently developed bloating and nausea after eating.  She has had mild dysuria but denies hematuria.  She denies rectal pain, burning, itching.\par \par The patient now tells me that she had abdominal and pelvic transvaginal ultrasound on June 5, 2020.  I reviewed the reports.  The abdominal ultrasound was normal although the gallbladder was under distended and therefore evaluation was limited.  The pelvic ultrasound was significant for a mild to moderate amount of fluid in the left adnexal region which could be due to a recently ruptured cyst.\par \par \par Note from 8/25/2020 - We reviewed the patient's colonoscopy performed on July 7, 2020.  Nonspecific punctate inflammation was seen in the right colon with biopsy showing acute and chronic inflammation, acute cryptitis, and crypt abscesses.  There was no evidence of chronicity and this appeared to be more consistent with an acute self-limited colitis than chronic inflammatory bowel disease.  The patient is feeling better with resolution of her abdominal pain.  She is having solid bowel movements 5-6 times a week.  She does see intermittent blood on the toilet paper.  She denies melena.\par \par \par Note from 6/18/2020 - The patient is a 25-year-old woman who was well until 2 months ago, when she developed rectal bleeding and constipation.  She states that she began to go 1 to 2 days without a bowel movement and was straining to pass her stools.  She has noted red blood streaking on the stool and on the toilet paper.  She notes that this was during the COVID-19 isolation, when she had a significant change in her exercise and her diet.  She is a  and had significant reduction in her physical activity.  She also had been a vegan but began to eat fish and eggs.  Ever since then, the patient has had blood with every bowel movement.  The patient also notes that for the past month, she has had left-sided abdominal pain which is intermittent.  She denies heartburn, dysphasia, nausea, vomiting.  The patient's weight is stable.\par \par She was put on a stool softener and prune juice initially which did not help her.  She saw a gastroenterologist 1 week ago who recommended a high-fiber diet and placed her on Citrucel 2 tablespoons twice daily.  She has been moving her bowels 1-2 times a day but still had blood on all her bowel movements until today.  The patient's pain has lessened this week.\par \par  The patient has not been admitted to the hospital in the past year and denies any cardiac issues.

## 2022-10-19 NOTE — ASSESSMENT
[FreeTextEntry1] : Patient currently with bright red blood on the toilet paper and left-sided abdominal pain.  She has a history of mild right-sided colitis seen on colonoscopy in 2020.  She reportedly had a sigmoidoscopy last year that showed proctitis.  The patient requires a full evaluation to consider inflammatory bowel disease.\par \par A colonoscopy has been scheduled. The risks, benefits, alternatives, and limitations of the procedure, including the possibility of missed lesions, were explained.\par \par Blood work was sent for CBC, iron studies, sed rate, C-reactive protein.\par \par \par Plan from 12/2/2020 - Patient with ongoing complaints of left-sided abdominal pain.  She is mildly tender in the left lower quadrant.  Abdominal ultrasound was normal but pelvic ultrasound showed a left ovarian corpus luteal cyst and a very tiny focus in the left ovary that was too small to characterize.\par \par Patient was advised to follow-up with her gynecologist regarding the ovarian findings.\par \par Patient was sent for CT scan of the abdomen and pelvis given her ongoing pain and tenderness.  We need to rule out diverticulitis and colitis, especially as the patient had evidence of right-sided colitis on colonoscopy.\par \par Patient was advised that she should be using the Citrucel daily and drinking at least 64 ounces of water a day to keep her bowel movements more regular, which could potentially help her pain.\par \par We will repeat a colonoscopy in July 2021 to follow-up the finding of colitis to see if this is a chronic condition.\par \par \par Plan from 11/4/2020 - Patient with right-sided colitis on colonoscopy who notes improvement but not resolution of her left lower quadrant pain and rectal bleeding.  Her bloating and nausea have improved.  She had an anal fissure with a last visit 2 weeks ago.\par \par Patient was advised to continue using Citrucel daily.  She admits to not drinking enough water and was advised to drink at least 64 ounces of water a day.\par \par Patient will proceed with abdominal and transvaginal ultrasound, scheduled for next week.\par \par We will observe her symptoms.  The patient will return to see me in 1 month but will call me if anything worsens in the interim.\par \par We will repeat a colonoscopy in July 2021.\par \par \par Plan from 10/21/2020 - Patient with symptoms of left-sided abdominal pain, postprandial bloating and nausea, and mild rectal bleeding.  An anal fissure was found on exam.  The patient did have evidence of acute right sided colitis on previous colonoscopy but this does not seem to be the cause of the patient's symptoms.  The patient appears to have had a ruptured ovarian cyst back in June and it is possible that this is happening again.\par \par Patient was sent for repeat abdominal and transvaginal pelvic ultrasound.\par \par Blood work was sent for CBC, celiac markers, hCG.\par \par Urine was sent for UA and C&S.\par \par Patient was advised to use Citrucel daily to help with the passage of bowels and healing of the anal fissure.\par \par Patient will return to see me in 2 weeks.  If she has ongoing bloating and nausea, she may need EGD.\par \par The plan is to repeat colonoscopy in July 2021.\par \par \par Plan from 8/25/2020 - Patient with evidence of colitis in the right colon on colonoscopy.  Pathology was more consistent with an acute self-limited colitis rather than inflammatory bowel disease.  The patient's symptoms appear improved.\par \par We discussed the significance of these findings.  The patient's symptoms had lasted quite a while and the cause is unclear.\par \par Blood work was sent for CBC, CHEM pack, sed rate, C-reactive protein, and IBD serologies.\par \par We will repeat a colonoscopy in 1 year that is July 2021 2 rule out a chronic colitis.\par \par Patient was advised to use Citrucel daily and increase water intake to help with her bowel movements.\par \par She will call me if her symptoms change or worsen in any way.  Otherwise she will return to see me next year.\par \par \par Plan from 6/18/2020 - Patient with new onset constipation and rectal bleeding with associated left-sided abdominal pain.  This appears to correspond to the patient's change in diet with less fiber and decreased physical activity.  The patient is concerned as she has colon cancer in her family.\par \par I advised the patient that she should continue Citrucel and follow a high-fiber diet.  She was also encouraged to increase her fluid intake.\par \par A colonoscopy has been scheduled. The risks, benefits, alternatives, and limitations of the procedure, including the possibility of missed lesions, were explained.

## 2022-10-20 ENCOUNTER — NON-APPOINTMENT (OUTPATIENT)
Age: 27
End: 2022-10-20

## 2022-10-20 LAB
BASOPHILS # BLD AUTO: 0.07 K/UL
BASOPHILS NFR BLD AUTO: 1.2 %
CRP SERPL-MCNC: <3 MG/L
EOSINOPHIL # BLD AUTO: 0.66 K/UL
EOSINOPHIL NFR BLD AUTO: 11.1 %
ERYTHROCYTE [SEDIMENTATION RATE] IN BLOOD BY WESTERGREN METHOD: 10 MM/HR
FERRITIN SERPL-MCNC: 16 NG/ML
HCT VFR BLD CALC: 39.3 %
HGB BLD-MCNC: 12.5 G/DL
IMM GRANULOCYTES NFR BLD AUTO: 0.5 %
IRON SATN MFR SERPL: 36 %
IRON SERPL-MCNC: 141 UG/DL
LYMPHOCYTES # BLD AUTO: 1.64 K/UL
LYMPHOCYTES NFR BLD AUTO: 27.7 %
MAN DIFF?: NORMAL
MCHC RBC-ENTMCNC: 27.5 PG
MCHC RBC-ENTMCNC: 31.8 GM/DL
MCV RBC AUTO: 86.4 FL
MONOCYTES # BLD AUTO: 0.47 K/UL
MONOCYTES NFR BLD AUTO: 7.9 %
NEUTROPHILS # BLD AUTO: 3.05 K/UL
NEUTROPHILS NFR BLD AUTO: 51.6 %
PLATELET # BLD AUTO: 323 K/UL
RBC # BLD: 4.55 M/UL
RBC # FLD: 14.2 %
TIBC SERPL-MCNC: 392 UG/DL
UIBC SERPL-MCNC: 251 UG/DL
WBC # FLD AUTO: 5.92 K/UL

## 2022-11-18 ENCOUNTER — NON-APPOINTMENT (OUTPATIENT)
Age: 27
End: 2022-11-18

## 2022-11-22 ENCOUNTER — APPOINTMENT (OUTPATIENT)
Dept: GASTROENTEROLOGY | Facility: AMBULATORY MEDICAL SERVICES | Age: 27
End: 2022-11-22

## 2022-11-22 PROCEDURE — 45380 COLONOSCOPY AND BIOPSY: CPT

## 2022-12-13 ENCOUNTER — APPOINTMENT (OUTPATIENT)
Dept: GASTROENTEROLOGY | Facility: CLINIC | Age: 27
End: 2022-12-13

## 2022-12-13 VITALS
BODY MASS INDEX: 22.24 KG/M2 | WEIGHT: 138.4 LBS | DIASTOLIC BLOOD PRESSURE: 81 MMHG | TEMPERATURE: 97.7 F | SYSTOLIC BLOOD PRESSURE: 112 MMHG | OXYGEN SATURATION: 99 % | HEART RATE: 90 BPM | HEIGHT: 66 IN

## 2022-12-13 PROCEDURE — 99215 OFFICE O/P EST HI 40 MIN: CPT

## 2022-12-16 NOTE — CONSULT LETTER
[FreeTextEntry1] : Dear Dr. Jeanie Jones,\par \par I had the pleasure of seeing your patient ADARSH SAMUEL in the office today.  My office note is attached. PLEASE READ THE "ASSESSMENT" SECTION OF THE NOTE TO SEE MY IMPRESSION AND PLAN.\par \par Thank you very much for allowing me to participate in the care of your patient.\par \par Sincerely,\par \par Ke Peguero M.D., FACG, FACP\par Director, Celiac Program at [facility]\par  of Medicine, [employer] [facility] [place] Adjunct  of Medicine, [facility] Practice Director, [facility]ology at [place]\[facility] [address]\[place] [address]\par Tel: [phone]\par Email: [email or web address]\par \par \par The attached note has been created using a voice recognition system (Dragon).  There may be some misspellings and typos.  Please call my office if you have any issues or questions.

## 2022-12-16 NOTE — HISTORY OF PRESENT ILLNESS
[FreeTextEntry1] : We reviewed the evaluations done since the patient's last visit on October 18, 2022.  Blood work was normal from that day.  The patient underwent colonoscopy on November 22, 2022, which was significant for aphthous ulcers in the cecum, proximal transverse colon, proximal descending colon, and rectum.  Biopsies showed chronic active colitis with skip areas consistent with active inflammatory bowel disease.  This would be consistent with Crohn's disease.  At the time of colonoscopy, the patient was seeing blood in the stool and was having loose, mucousy stools.  Over the past 2 weeks, she has been doing better.  She reports solid stools every 2 days without melena or bright red blood per rectum.  She does get some nausea but denies abdominal pain.\par \par \par Note from 10/18/2022 - The patient is seen for the first time since her last visit on December 2, 2020.  At that time, she had had mild right-sided colitis on colonoscopy.  The biopsies appear to be more likely acute will go chronicity needed to be evaluated.  She had a CT scan on December 15, 2020 which was negative other than a small left ovarian cyst.  She saw another gastroenterologist 1 year ago with rectal bleeding.  She saw Dr. Ramirez who reportedly performed a sigmoidoscopy which showed proctitis.  The patient was given mesalamine suppositories which she uses periodically.  She gets "flareups" where she has bright red blood on the toilet paper.  Her bowel movements go from a baseline of 1 solid bowel movement a day to incomplete bowel movements every other day.  She has left-sided pain and tightness during the flareups.  She denies melena.  These events occur a couple of times a year but last weeks at a time.  She has currently been having bleeding on the toilet paper for the past 2 weeks.  She denies diarrhea, heartburn, dysphagia.  The patient's weight is stable.  The patient has not been admitted to the hospital in the past year and denies any cardiac issues.\par \par \par Note from 12/2/2020 - We reviewed the patient's ultrasound of the abdomen and pelvis performed on November 27, 2020.  The pelvic ultrasound revealed a left ovarian corpus luteal cyst and a tiny echogenic focus in the left ovary that was too small to characterize.  The patient continues to have intermittent left-sided abdominal pain which was worse 2 weeks ago.  She had been taking Citrucel daily but stopped this and states that her stools are now "bad".  She moves her bowels every 2 to 3 days and strains.  She is seeing blood with every bowel movement on the toilet paper and a little bit in the bowl.  She denies melena.  She denies fevers.\par \par \par Note from 11/4/2020 - Patient is feeling somewhat better.  She has been using Citrucel daily and is moving her bowels every 1 to 2 days, sometimes straining.  She sees bright red blood on the toilet paper only with harder stools.  She denies melena.  She notes that she has less left lower quadrant abdominal pain although it has not resolved.  She also notes that bloating and nausea have improved.  Blood work and urine testing from her last visit on October 21, 2020 were normal.  The patient has not yet had the abdominal and transvaginal ultrasound, which is scheduled for next week.  She has a history of right-sided colitis in the colon and was found to have an anal fissure at her last visit.\par \par \par Note from 10/21/2020 - The patient was last seen on August 25 at which time her symptoms had resolved.  She had had findings of acute colitis in the right colon.  Blood work at that time was normal.  She was well until 1-1/2 to 2 weeks ago when she states that her symptoms have started to return.  She now has mild left-sided abdominal pain. She has 1 solid bowel movement a day and does see some blood on the toilet paper.  She denies melena.  She denies heartburn or dysphagia.  She has also recently developed bloating and nausea after eating.  She has had mild dysuria but denies hematuria.  She denies rectal pain, burning, itching.\par \par The patient now tells me that she had abdominal and pelvic transvaginal ultrasound on June 5, 2020.  I reviewed the reports.  The abdominal ultrasound was normal although the gallbladder was under distended and therefore evaluation was limited.  The pelvic ultrasound was significant for a mild to moderate amount of fluid in the left adnexal region which could be due to a recently ruptured cyst.\par \par \par Note from 8/25/2020 - We reviewed the patient's colonoscopy performed on July 7, 2020.  Nonspecific punctate inflammation was seen in the right colon with biopsy showing acute and chronic inflammation, acute cryptitis, and crypt abscesses.  There was no evidence of chronicity and this appeared to be more consistent with an acute self-limited colitis than chronic inflammatory bowel disease.  The patient is feeling better with resolution of her abdominal pain.  She is having solid bowel movements 5-6 times a week.  She does see intermittent blood on the toilet paper.  She denies melena.\par \par \par Note from 6/18/2020 - The patient is a 25-year-old woman who was well until 2 months ago, when she developed rectal bleeding and constipation.  She states that she began to go 1 to 2 days without a bowel movement and was straining to pass her stools.  She has noted red blood streaking on the stool and on the toilet paper.  She notes that this was during the COVID-19 isolation, when she had a significant change in her exercise and her diet.  She is a  and had significant reduction in her physical activity.  She also had been a vegan but began to eat fish and eggs.  Ever since then, the patient has had blood with every bowel movement.  The patient also notes that for the past month, she has had left-sided abdominal pain which is intermittent.  She denies heartburn, dysphasia, nausea, vomiting.  The patient's weight is stable.\par \par She was put on a stool softener and prune juice initially which did not help her.  She saw a gastroenterologist 1 week ago who recommended a high-fiber diet and placed her on Citrucel 2 tablespoons twice daily.  She has been moving her bowels 1-2 times a day but still had blood on all her bowel movements until today.  The patient's pain has lessened this week.\par \par  The patient has not been admitted to the hospital in the past year and denies any cardiac issues.

## 2022-12-16 NOTE — ASSESSMENT
[FreeTextEntry1] : Patient with aphthous ulcers in patches throughout the colon with skip areas.  Biopsies revealed chronic active inflammatory bowel disease with skip lesions consistent with Crohn's disease.  The patient has had only mild symptoms.\par \par I had a long discussion with the patient regarding the diagnosis.  She has had findings of colitis/proctitis dating back to 2020.  This is consistent with chronic inflammatory bowel disease, Crohn's type.\par \par I have started the patient on the Lialda formulation of mesalamine 4.8 g a day.\par \par The patient was sent for an MR enterography to rule out any small bowel disease.\par \par We will repeat a colonoscopy in 1 year that is November 2023.\par \par Patient will return to see me in 3 months.\par \par \par Plan from 10/18/2022 - Patient currently with bright red blood on the toilet paper and left-sided abdominal pain.  She has a history of mild right-sided colitis seen on colonoscopy in 2020.  She reportedly had a sigmoidoscopy last year that showed proctitis.  The patient requires a full evaluation to consider inflammatory bowel disease.\par \par A colonoscopy has been scheduled. The risks, benefits, alternatives, and limitations of the procedure, including the possibility of missed lesions, were explained.\par \par Blood work was sent for CBC, iron studies, sed rate, C-reactive protein.\par \par \par Plan from 12/2/2020 - Patient with ongoing complaints of left-sided abdominal pain.  She is mildly tender in the left lower quadrant.  Abdominal ultrasound was normal but pelvic ultrasound showed a left ovarian corpus luteal cyst and a very tiny focus in the left ovary that was too small to characterize.\par \par Patient was advised to follow-up with her gynecologist regarding the ovarian findings.\par \par Patient was sent for CT scan of the abdomen and pelvis given her ongoing pain and tenderness.  We need to rule out diverticulitis and colitis, especially as the patient had evidence of right-sided colitis on colonoscopy.\par \par Patient was advised that she should be using the Citrucel daily and drinking at least 64 ounces of water a day to keep her bowel movements more regular, which could potentially help her pain.\par \par We will repeat a colonoscopy in July 2021 to follow-up the finding of colitis to see if this is a chronic condition.\par \par \par Plan from 11/4/2020 - Patient with right-sided colitis on colonoscopy who notes improvement but not resolution of her left lower quadrant pain and rectal bleeding.  Her bloating and nausea have improved.  She had an anal fissure with a last visit 2 weeks ago.\par \par Patient was advised to continue using Citrucel daily.  She admits to not drinking enough water and was advised to drink at least 64 ounces of water a day.\par \par Patient will proceed with abdominal and transvaginal ultrasound, scheduled for next week.\par \par We will observe her symptoms.  The patient will return to see me in 1 month but will call me if anything worsens in the interim.\par \par We will repeat a colonoscopy in July 2021.\par \par \par Plan from 10/21/2020 - Patient with symptoms of left-sided abdominal pain, postprandial bloating and nausea, and mild rectal bleeding.  An anal fissure was found on exam.  The patient did have evidence of acute right sided colitis on previous colonoscopy but this does not seem to be the cause of the patient's symptoms.  The patient appears to have had a ruptured ovarian cyst back in June and it is possible that this is happening again.\par \par Patient was sent for repeat abdominal and transvaginal pelvic ultrasound.\par \par Blood work was sent for CBC, celiac markers, hCG.\par \par Urine was sent for UA and C&S.\par \par Patient was advised to use Citrucel daily to help with the passage of bowels and healing of the anal fissure.\par \par Patient will return to see me in 2 weeks.  If she has ongoing bloating and nausea, she may need EGD.\par \par The plan is to repeat colonoscopy in July 2021.\par \par \par Plan from 8/25/2020 - Patient with evidence of colitis in the right colon on colonoscopy.  Pathology was more consistent with an acute self-limited colitis rather than inflammatory bowel disease.  The patient's symptoms appear improved.\par \par We discussed the significance of these findings.  The patient's symptoms had lasted quite a while and the cause is unclear.\par \par Blood work was sent for CBC, CHEM pack, sed rate, C-reactive protein, and IBD serologies.\par \par We will repeat a colonoscopy in 1 year that is July 2021 2 rule out a chronic colitis.\par \par Patient was advised to use Citrucel daily and increase water intake to help with her bowel movements.\par \par She will call me if her symptoms change or worsen in any way.  Otherwise she will return to see me next year.\par \par \par Plan from 6/18/2020 - Patient with new onset constipation and rectal bleeding with associated left-sided abdominal pain.  This appears to correspond to the patient's change in diet with less fiber and decreased physical activity.  The patient is concerned as she has colon cancer in her family.\par \par I advised the patient that she should continue Citrucel and follow a high-fiber diet.  She was also encouraged to increase her fluid intake.\par \par A colonoscopy has been scheduled. The risks, benefits, alternatives, and limitations of the procedure, including the possibility of missed lesions, were explained.

## 2023-01-27 ENCOUNTER — OUTPATIENT (OUTPATIENT)
Dept: OUTPATIENT SERVICES | Facility: HOSPITAL | Age: 28
LOS: 1 days | End: 2023-01-27

## 2023-01-27 DIAGNOSIS — Z00.8 ENCOUNTER FOR OTHER GENERAL EXAMINATION: ICD-10-CM

## 2023-02-20 ENCOUNTER — RESULT REVIEW (OUTPATIENT)
Age: 28
End: 2023-02-20

## 2023-03-09 ENCOUNTER — APPOINTMENT (OUTPATIENT)
Dept: MRI IMAGING | Facility: CLINIC | Age: 28
End: 2023-03-09
Payer: COMMERCIAL

## 2023-03-09 ENCOUNTER — OUTPATIENT (OUTPATIENT)
Dept: OUTPATIENT SERVICES | Facility: HOSPITAL | Age: 28
LOS: 1 days | End: 2023-03-09
Payer: COMMERCIAL

## 2023-03-09 DIAGNOSIS — K50.90 CROHN'S DISEASE, UNSPECIFIED, WITHOUT COMPLICATIONS: ICD-10-CM

## 2023-03-09 PROCEDURE — 72197 MRI PELVIS W/O & W/DYE: CPT | Mod: 26

## 2023-03-09 PROCEDURE — 74183 MRI ABD W/O CNTR FLWD CNTR: CPT

## 2023-03-09 PROCEDURE — 74183 MRI ABD W/O CNTR FLWD CNTR: CPT | Mod: 26

## 2023-03-09 PROCEDURE — A9585: CPT

## 2023-03-09 PROCEDURE — 72197 MRI PELVIS W/O & W/DYE: CPT

## 2023-03-13 ENCOUNTER — NON-APPOINTMENT (OUTPATIENT)
Age: 28
End: 2023-03-13

## 2023-03-21 ENCOUNTER — APPOINTMENT (OUTPATIENT)
Dept: GASTROENTEROLOGY | Facility: CLINIC | Age: 28
End: 2023-03-21
Payer: COMMERCIAL

## 2023-03-21 VITALS
HEIGHT: 66 IN | SYSTOLIC BLOOD PRESSURE: 100 MMHG | WEIGHT: 147 LBS | BODY MASS INDEX: 23.63 KG/M2 | TEMPERATURE: 97.7 F | DIASTOLIC BLOOD PRESSURE: 74 MMHG

## 2023-03-21 PROCEDURE — 99214 OFFICE O/P EST MOD 30 MIN: CPT | Mod: 25

## 2023-03-22 NOTE — HISTORY OF PRESENT ILLNESS
[FreeTextEntry1] : The patient has Crohn's colitis with skip lesions in the colon.  She is on Lialda 4.8 g a day.  She had a normal MR enterography on March 9, 2023.  The patient is feeling well.  She has 1 bowel movement a day which is mostly solid.  There is no further bleeding.  She denies melena or abdominal pain.  She also notes significantly decreased bloating.  She denies heartburn, dysphagia, nausea, vomiting.  Of note, the patient has had a dry cough and congestion for the past year.  This is worse at night primarily with laying down.\par \par \par Note from 12/13/2022 - We reviewed the evaluations done since the patient's last visit on October 18, 2022.  Blood work was normal from that day.  The patient underwent colonoscopy on November 22, 2022, which was significant for aphthous ulcers in the cecum, proximal transverse colon, proximal descending colon, and rectum.  Biopsies showed chronic active colitis with skip areas consistent with active inflammatory bowel disease.  This would be consistent with Crohn's disease.  At the time of colonoscopy, the patient was seeing blood in the stool and was having loose, mucousy stools.  Over the past 2 weeks, she has been doing better.  She reports solid stools every 2 days without melena or bright red blood per rectum.  She does get some nausea but denies abdominal pain.\par \par \par Note from 10/18/2022 - The patient is seen for the first time since her last visit on December 2, 2020.  At that time, she had had mild right-sided colitis on colonoscopy.  The biopsies appear to be more likely acute will go chronicity needed to be evaluated.  She had a CT scan on December 15, 2020 which was negative other than a small left ovarian cyst.  She saw another gastroenterologist 1 year ago with rectal bleeding.  She saw Dr. Ramirez who reportedly performed a sigmoidoscopy which showed proctitis.  The patient was given mesalamine suppositories which she uses periodically.  She gets "flareups" where she has bright red blood on the toilet paper.  Her bowel movements go from a baseline of 1 solid bowel movement a day to incomplete bowel movements every other day.  She has left-sided pain and tightness during the flareups.  She denies melena.  These events occur a couple of times a year but last weeks at a time.  She has currently been having bleeding on the toilet paper for the past 2 weeks.  She denies diarrhea, heartburn, dysphagia.  The patient's weight is stable.  The patient has not been admitted to the hospital in the past year and denies any cardiac issues.\par \par \par Note from 12/2/2020 - We reviewed the patient's ultrasound of the abdomen and pelvis performed on November 27, 2020.  The pelvic ultrasound revealed a left ovarian corpus luteal cyst and a tiny echogenic focus in the left ovary that was too small to characterize.  The patient continues to have intermittent left-sided abdominal pain which was worse 2 weeks ago.  She had been taking Citrucel daily but stopped this and states that her stools are now "bad".  She moves her bowels every 2 to 3 days and strains.  She is seeing blood with every bowel movement on the toilet paper and a little bit in the bowl.  She denies melena.  She denies fevers.\par \par \par Note from 11/4/2020 - Patient is feeling somewhat better.  She has been using Citrucel daily and is moving her bowels every 1 to 2 days, sometimes straining.  She sees bright red blood on the toilet paper only with harder stools.  She denies melena.  She notes that she has less left lower quadrant abdominal pain although it has not resolved.  She also notes that bloating and nausea have improved.  Blood work and urine testing from her last visit on October 21, 2020 were normal.  The patient has not yet had the abdominal and transvaginal ultrasound, which is scheduled for next week.  She has a history of right-sided colitis in the colon and was found to have an anal fissure at her last visit.\par \par \par Note from 10/21/2020 - The patient was last seen on August 25 at which time her symptoms had resolved.  She had had findings of acute colitis in the right colon.  Blood work at that time was normal.  She was well until 1-1/2 to 2 weeks ago when she states that her symptoms have started to return.  She now has mild left-sided abdominal pain. She has 1 solid bowel movement a day and does see some blood on the toilet paper.  She denies melena.  She denies heartburn or dysphagia.  She has also recently developed bloating and nausea after eating.  She has had mild dysuria but denies hematuria.  She denies rectal pain, burning, itching.\par \par The patient now tells me that she had abdominal and pelvic transvaginal ultrasound on June 5, 2020.  I reviewed the reports.  The abdominal ultrasound was normal although the gallbladder was under distended and therefore evaluation was limited.  The pelvic ultrasound was significant for a mild to moderate amount of fluid in the left adnexal region which could be due to a recently ruptured cyst.\par \par \par Note from 8/25/2020 - We reviewed the patient's colonoscopy performed on July 7, 2020.  Nonspecific punctate inflammation was seen in the right colon with biopsy showing acute and chronic inflammation, acute cryptitis, and crypt abscesses.  There was no evidence of chronicity and this appeared to be more consistent with an acute self-limited colitis than chronic inflammatory bowel disease.  The patient is feeling better with resolution of her abdominal pain.  She is having solid bowel movements 5-6 times a week.  She does see intermittent blood on the toilet paper.  She denies melena.\par \par \par Note from 6/18/2020 - The patient is a 25-year-old woman who was well until 2 months ago, when she developed rectal bleeding and constipation.  She states that she began to go 1 to 2 days without a bowel movement and was straining to pass her stools.  She has noted red blood streaking on the stool and on the toilet paper.  She notes that this was during the COVID-19 isolation, when she had a significant change in her exercise and her diet.  She is a  and had significant reduction in her physical activity.  She also had been a vegan but began to eat fish and eggs.  Ever since then, the patient has had blood with every bowel movement.  The patient also notes that for the past month, she has had left-sided abdominal pain which is intermittent.  She denies heartburn, dysphasia, nausea, vomiting.  The patient's weight is stable.\par \par She was put on a stool softener and prune juice initially which did not help her.  She saw a gastroenterologist 1 week ago who recommended a high-fiber diet and placed her on Citrucel 2 tablespoons twice daily.  She has been moving her bowels 1-2 times a day but still had blood on all her bowel movements until today.  The patient's pain has lessened this week.\par \par  The patient has not been admitted to the hospital in the past year and denies any cardiac issues.

## 2023-03-22 NOTE — ASSESSMENT
[FreeTextEntry1] : Patient with Crohn's colitis with skip areas.  MR enterography was normal.  She is doing well on Lialda 4.8 g a day.  She has a dry cough for the past year.\par \par Bloodwork was sent for CBC, Chem-darren, TSH, ESR, C-reactive protein, iron studies, B12, folate, vitamin D, amylase, lipase.\par \par Patient will continue Lialda (mesalamine) 4.8 g a day.\par \par Patient was advised to see an ENT doctor for evaluation for possible postnasal drip.  If the evaluation is unrevealing or suggestive of reflux and she has continued symptoms, we will consider reflux as an etiology.\par \par Patient will return to see me in 6 months.  She is due for colonoscopy in November 2023.\par \par \par Plan from 12/13/2022 - Patient with aphthous ulcers in patches throughout the colon with skip areas.  Biopsies revealed chronic active inflammatory bowel disease with skip lesions consistent with Crohn's disease.  The patient has had only mild symptoms.\par \par I had a long discussion with the patient regarding the diagnosis.  She has had findings of colitis/proctitis dating back to 2020.  This is consistent with chronic inflammatory bowel disease, Crohn's type.\par \par I have started the patient on the Lialda formulation of mesalamine 4.8 g a day.\par \par The patient was sent for an MR enterography to rule out any small bowel disease.\par \par We will repeat a colonoscopy in 1 year that is November 2023.\par \par Patient will return to see me in 3 months.\par \par \par Plan from 10/18/2022 - Patient currently with bright red blood on the toilet paper and left-sided abdominal pain.  She has a history of mild right-sided colitis seen on colonoscopy in 2020.  She reportedly had a sigmoidoscopy last year that showed proctitis.  The patient requires a full evaluation to consider inflammatory bowel disease.\par \par A colonoscopy has been scheduled. The risks, benefits, alternatives, and limitations of the procedure, including the possibility of missed lesions, were explained.\par \par Blood work was sent for CBC, iron studies, sed rate, C-reactive protein.\par \par \par Plan from 12/2/2020 - Patient with ongoing complaints of left-sided abdominal pain.  She is mildly tender in the left lower quadrant.  Abdominal ultrasound was normal but pelvic ultrasound showed a left ovarian corpus luteal cyst and a very tiny focus in the left ovary that was too small to characterize.\par \par Patient was advised to follow-up with her gynecologist regarding the ovarian findings.\par \par Patient was sent for CT scan of the abdomen and pelvis given her ongoing pain and tenderness.  We need to rule out diverticulitis and colitis, especially as the patient had evidence of right-sided colitis on colonoscopy.\par \par Patient was advised that she should be using the Citrucel daily and drinking at least 64 ounces of water a day to keep her bowel movements more regular, which could potentially help her pain.\par \par We will repeat a colonoscopy in July 2021 to follow-up the finding of colitis to see if this is a chronic condition.\par \par \par Plan from 11/4/2020 - Patient with right-sided colitis on colonoscopy who notes improvement but not resolution of her left lower quadrant pain and rectal bleeding.  Her bloating and nausea have improved.  She had an anal fissure with a last visit 2 weeks ago.\par \par Patient was advised to continue using Citrucel daily.  She admits to not drinking enough water and was advised to drink at least 64 ounces of water a day.\par \par Patient will proceed with abdominal and transvaginal ultrasound, scheduled for next week.\par \par We will observe her symptoms.  The patient will return to see me in 1 month but will call me if anything worsens in the interim.\par \par We will repeat a colonoscopy in July 2021.\par \par \par Plan from 10/21/2020 - Patient with symptoms of left-sided abdominal pain, postprandial bloating and nausea, and mild rectal bleeding.  An anal fissure was found on exam.  The patient did have evidence of acute right sided colitis on previous colonoscopy but this does not seem to be the cause of the patient's symptoms.  The patient appears to have had a ruptured ovarian cyst back in June and it is possible that this is happening again.\par \par Patient was sent for repeat abdominal and transvaginal pelvic ultrasound.\par \par Blood work was sent for CBC, celiac markers, hCG.\par \par Urine was sent for UA and C&S.\par \par Patient was advised to use Citrucel daily to help with the passage of bowels and healing of the anal fissure.\par \par Patient will return to see me in 2 weeks.  If she has ongoing bloating and nausea, she may need EGD.\par \par The plan is to repeat colonoscopy in July 2021.\par \par \par Plan from 8/25/2020 - Patient with evidence of colitis in the right colon on colonoscopy.  Pathology was more consistent with an acute self-limited colitis rather than inflammatory bowel disease.  The patient's symptoms appear improved.\par \par We discussed the significance of these findings.  The patient's symptoms had lasted quite a while and the cause is unclear.\par \par Blood work was sent for CBC, CHEM pack, sed rate, C-reactive protein, and IBD serologies.\par \par We will repeat a colonoscopy in 1 year that is July 2021 2 rule out a chronic colitis.\par \par Patient was advised to use Citrucel daily and increase water intake to help with her bowel movements.\par \par She will call me if her symptoms change or worsen in any way.  Otherwise she will return to see me next year.\par \par \par Plan from 6/18/2020 - Patient with new onset constipation and rectal bleeding with associated left-sided abdominal pain.  This appears to correspond to the patient's change in diet with less fiber and decreased physical activity.  The patient is concerned as she has colon cancer in her family.\par \par I advised the patient that she should continue Citrucel and follow a high-fiber diet.  She was also encouraged to increase her fluid intake.\par \par A colonoscopy has been scheduled. The risks, benefits, alternatives, and limitations of the procedure, including the possibility of missed lesions, were explained.

## 2023-03-22 NOTE — CONSULT LETTER
[FreeTextEntry1] : Dear Dr. Jeanie Jones,\par \par I had the pleasure of seeing your patient ADARSH SAMUEL in the office today.  My office note is attached. PLEASE READ THE "ASSESSMENT" SECTION OF THE NOTE TO SEE MY IMPRESSION AND PLAN.\par \par Thank you very much for allowing me to participate in the care of your patient.\par \par Sincerely,\par \par Ke Peguero M.D., FACG, FACP\par Director, Celiac Program at [facility]\par  of Medicine, [facility] [employer] at [facility] Adjunct  of Medicine, [facility] Practice Director, [facility] - Gastroenterology at [place]\[facility] [address]\[place] [address]\par Tel: [phone]\par Email: [email or web address]\par \par \par The attached note has been created using a voice recognition system (Dragon).  There may be some misspellings and typos.  Please call my office if you have any issues or questions.

## 2023-03-23 ENCOUNTER — NON-APPOINTMENT (OUTPATIENT)
Age: 28
End: 2023-03-23

## 2023-03-23 LAB
25(OH)D3 SERPL-MCNC: 23.7 NG/ML
ALBUMIN SERPL ELPH-MCNC: 5 G/DL
ALP BLD-CCNC: 59 U/L
ALT SERPL-CCNC: 12 U/L
AMYLASE/CREAT SERPL: 84 U/L
ANION GAP SERPL CALC-SCNC: 10 MMOL/L
AST SERPL-CCNC: 14 U/L
BASOPHILS # BLD AUTO: 0.08 K/UL
BASOPHILS NFR BLD AUTO: 1.2 %
BILIRUB SERPL-MCNC: 0.3 MG/DL
BUN SERPL-MCNC: 12 MG/DL
CALCIUM SERPL-MCNC: 9.9 MG/DL
CHLORIDE SERPL-SCNC: 103 MMOL/L
CO2 SERPL-SCNC: 26 MMOL/L
CREAT SERPL-MCNC: 0.58 MG/DL
CRP SERPL-MCNC: <3 MG/L
EGFR: 127 ML/MIN/1.73M2
EOSINOPHIL # BLD AUTO: 0.9 K/UL
EOSINOPHIL NFR BLD AUTO: 13.4 %
ERYTHROCYTE [SEDIMENTATION RATE] IN BLOOD BY WESTERGREN METHOD: 2 MM/HR
FERRITIN SERPL-MCNC: 10 NG/ML
FOLATE SERPL-MCNC: 10.3 NG/ML
GLUCOSE SERPL-MCNC: 83 MG/DL
HCT VFR BLD CALC: 40.9 %
HGB BLD-MCNC: 12.5 G/DL
IMM GRANULOCYTES NFR BLD AUTO: 0.3 %
IRON SATN MFR SERPL: 15 %
IRON SERPL-MCNC: 54 UG/DL
LPL SERPL-CCNC: 32 U/L
LYMPHOCYTES # BLD AUTO: 1.77 K/UL
LYMPHOCYTES NFR BLD AUTO: 26.3 %
MAN DIFF?: NORMAL
MCHC RBC-ENTMCNC: 27.8 PG
MCHC RBC-ENTMCNC: 30.6 GM/DL
MCV RBC AUTO: 90.9 FL
MONOCYTES # BLD AUTO: 0.49 K/UL
MONOCYTES NFR BLD AUTO: 7.3 %
NEUTROPHILS # BLD AUTO: 3.48 K/UL
NEUTROPHILS NFR BLD AUTO: 51.5 %
PLATELET # BLD AUTO: 290 K/UL
POTASSIUM SERPL-SCNC: 4.4 MMOL/L
PROT SERPL-MCNC: 7.4 G/DL
RBC # BLD: 4.5 M/UL
RBC # FLD: 14.3 %
SODIUM SERPL-SCNC: 139 MMOL/L
TIBC SERPL-MCNC: 367 UG/DL
TSH SERPL-ACNC: 1.2 UIU/ML
UIBC SERPL-MCNC: 313 UG/DL
VIT B12 SERPL-MCNC: 513 PG/ML
WBC # FLD AUTO: 6.74 K/UL

## 2023-04-13 ENCOUNTER — NON-APPOINTMENT (OUTPATIENT)
Age: 28
End: 2023-04-13

## 2023-04-13 ENCOUNTER — APPOINTMENT (OUTPATIENT)
Dept: GASTROENTEROLOGY | Facility: CLINIC | Age: 28
End: 2023-04-13
Payer: COMMERCIAL

## 2023-04-13 VITALS
TEMPERATURE: 97.7 F | DIASTOLIC BLOOD PRESSURE: 77 MMHG | BODY MASS INDEX: 22.98 KG/M2 | HEIGHT: 66 IN | HEART RATE: 92 BPM | WEIGHT: 143 LBS | OXYGEN SATURATION: 99 % | SYSTOLIC BLOOD PRESSURE: 110 MMHG

## 2023-04-13 DIAGNOSIS — R14.0 ABDOMINAL DISTENSION (GASEOUS): ICD-10-CM

## 2023-04-13 PROCEDURE — 99215 OFFICE O/P EST HI 40 MIN: CPT

## 2023-04-13 PROCEDURE — 82270 OCCULT BLOOD FECES: CPT

## 2023-04-14 ENCOUNTER — LABORATORY RESULT (OUTPATIENT)
Age: 28
End: 2023-04-14

## 2023-04-14 NOTE — REASON FOR VISIT
[Other: _____] : [unfilled] [Acute] : an acute visit [FreeTextEntry1] : Crohn's disease, rectal bleeding, bloating, epigastric pain

## 2023-04-14 NOTE — CONSULT LETTER
[FreeTextEntry1] : Dear Dr. Jeanie Jones,\par \par I had the pleasure of seeing your patient ADARSH SAMUEL in the office today.  My office note is attached. PLEASE READ THE "ASSESSMENT" SECTION OF THE NOTE TO SEE MY IMPRESSION AND PLAN.\par \par Thank you very much for allowing me to participate in the care of your patient.\par \par Sincerely,\par \par Ke Peguero M.D., FACG, FACP\par Director, Celiac Program at [facility]\par  of Medicine, [employer] [facility] [employer] at [facility] Adjunct  of Medicine, [facility] Practice Director, [facility] - Gastroenterology at [place]\[facility] [address]\[place] [address]\par Tel: [phone]\par Email: [email or web address]\par \par \par The attached note has been created using a voice recognition system (Dragon).  There may be some misspellings and typos.  Please call my office if you have any issues or questions.

## 2023-04-14 NOTE — PHYSICAL EXAM
[General Appearance - Alert] : alert [General Appearance - In No Acute Distress] : in no acute distress [Neck Appearance] : the appearance of the neck was normal [Neck Cervical Mass (___cm)] : no neck mass was observed [Jugular Venous Distention Increased] : there was no jugular-venous distention [Thyroid Diffuse Enlargement] : the thyroid was not enlarged [Thyroid Nodule] : there were no palpable thyroid nodules [Auscultation Breath Sounds / Voice Sounds] : lungs were clear to auscultation bilaterally [Heart Rate And Rhythm] : heart rate was normal and rhythm regular [Heart Sounds] : normal S1 and S2 [Heart Sounds Gallop] : no gallops [Murmurs] : no murmurs [Heart Sounds Pericardial Friction Rub] : no pericardial rub [Edema] : there was no peripheral edema [Bowel Sounds] : normal bowel sounds [Abdomen Soft] : soft [Abdomen Tenderness] : non-tender [] : no hepato-splenomegaly [Abdomen Mass (___ Cm)] : no abdominal mass palpated [No CVA Tenderness] : no ~M costovertebral angle tenderness [No Spinal Tenderness] : no spinal tenderness [Oriented To Time, Place, And Person] : oriented to person, place, and time [Impaired Insight] : insight and judgment were intact [Affect] : the affect was normal [Normal Sphincter Tone] : normal sphincter tone [No Rectal Mass] : no rectal mass [Occult Blood] : negative occult blood [Chaperone Present: ____] : chaperone present: [unfilled]

## 2023-04-14 NOTE — ASSESSMENT
[FreeTextEntry1] : Patient with Crohn's colitis who may be having a mild flare with some bleeding and looser stool along with bloating and epigastric pain.  Her stool is currently guaiac negative.\par \par Bloodwork will be sent for CBC, Chem-darren, TSH, ESR, C-reactive protein, iron studies, B12, folate, hepatitis B and C serologies, quantiferon gold.\par \par Stool studies will be sent for a GI infectious PCR panel and C. diff by PCR and fecal calprotectin.\par \par Urine will be sent for UA and C&S.\par \par We will follow-up with a telephonic visit on Monday.  If she is having any worsening symptoms without infection, we will consider prednisone.\par \par Patient is due for colonoscopy in November 2023.\par \par \par Plan from 3/21/2023 - Patient with Crohn's colitis with skip areas.  MR enterography was normal.  She is doing well on Lialda 4.8 g a day.  She has a dry cough for the past year.\par \par Bloodwork was sent for CBC, Chem-darren, TSH, ESR, C-reactive protein, iron studies, B12, folate, vitamin D, amylase, lipase.\par \par Patient will continue Lialda (mesalamine) 4.8 g a day.\par \par Patient was advised to see an ENT doctor for evaluation for possible postnasal drip.  If the evaluation is unrevealing or suggestive of reflux and she has continued symptoms, we will consider reflux as an etiology.\par \par Patient will return to see me in 6 months.  She is due for colonoscopy in November 2023.\par \par \par Plan from 12/13/2022 - Patient with aphthous ulcers in patches throughout the colon with skip areas.  Biopsies revealed chronic active inflammatory bowel disease with skip lesions consistent with Crohn's disease.  The patient has had only mild symptoms.\par \par I had a long discussion with the patient regarding the diagnosis.  She has had findings of colitis/proctitis dating back to 2020.  This is consistent with chronic inflammatory bowel disease, Crohn's type.\par \par I have started the patient on the Lialda formulation of mesalamine 4.8 g a day.\par \par The patient was sent for an MR enterography to rule out any small bowel disease.\par \par We will repeat a colonoscopy in 1 year that is November 2023.\par \par Patient will return to see me in 3 months.\par \par \par Plan from 10/18/2022 - Patient currently with bright red blood on the toilet paper and left-sided abdominal pain.  She has a history of mild right-sided colitis seen on colonoscopy in 2020.  She reportedly had a sigmoidoscopy last year that showed proctitis.  The patient requires a full evaluation to consider inflammatory bowel disease.\par \par A colonoscopy has been scheduled. The risks, benefits, alternatives, and limitations of the procedure, including the possibility of missed lesions, were explained.\par \par Blood work was sent for CBC, iron studies, sed rate, C-reactive protein.\par \par \par Plan from 12/2/2020 - Patient with ongoing complaints of left-sided abdominal pain.  She is mildly tender in the left lower quadrant.  Abdominal ultrasound was normal but pelvic ultrasound showed a left ovarian corpus luteal cyst and a very tiny focus in the left ovary that was too small to characterize.\par \par Patient was advised to follow-up with her gynecologist regarding the ovarian findings.\par \par Patient was sent for CT scan of the abdomen and pelvis given her ongoing pain and tenderness.  We need to rule out diverticulitis and colitis, especially as the patient had evidence of right-sided colitis on colonoscopy.\par \par Patient was advised that she should be using the Citrucel daily and drinking at least 64 ounces of water a day to keep her bowel movements more regular, which could potentially help her pain.\par \par We will repeat a colonoscopy in July 2021 to follow-up the finding of colitis to see if this is a chronic condition.\par \par \par Plan from 11/4/2020 - Patient with right-sided colitis on colonoscopy who notes improvement but not resolution of her left lower quadrant pain and rectal bleeding.  Her bloating and nausea have improved.  She had an anal fissure with a last visit 2 weeks ago.\par \par Patient was advised to continue using Citrucel daily.  She admits to not drinking enough water and was advised to drink at least 64 ounces of water a day.\par \par Patient will proceed with abdominal and transvaginal ultrasound, scheduled for next week.\par \par We will observe her symptoms.  The patient will return to see me in 1 month but will call me if anything worsens in the interim.\par \par We will repeat a colonoscopy in July 2021.\par \par \par Plan from 10/21/2020 - Patient with symptoms of left-sided abdominal pain, postprandial bloating and nausea, and mild rectal bleeding.  An anal fissure was found on exam.  The patient did have evidence of acute right sided colitis on previous colonoscopy but this does not seem to be the cause of the patient's symptoms.  The patient appears to have had a ruptured ovarian cyst back in June and it is possible that this is happening again.\par \par Patient was sent for repeat abdominal and transvaginal pelvic ultrasound.\par \par Blood work was sent for CBC, celiac markers, hCG.\par \par Urine was sent for UA and C&S.\par \par Patient was advised to use Citrucel daily to help with the passage of bowels and healing of the anal fissure.\par \par Patient will return to see me in 2 weeks.  If she has ongoing bloating and nausea, she may need EGD.\par \par The plan is to repeat colonoscopy in July 2021.\par \par \par Plan from 8/25/2020 - Patient with evidence of colitis in the right colon on colonoscopy.  Pathology was more consistent with an acute self-limited colitis rather than inflammatory bowel disease.  The patient's symptoms appear improved.\par \par We discussed the significance of these findings.  The patient's symptoms had lasted quite a while and the cause is unclear.\par \par Blood work was sent for CBC, CHEM pack, sed rate, C-reactive protein, and IBD serologies.\par \par We will repeat a colonoscopy in 1 year that is July 2021 2 rule out a chronic colitis.\par \par Patient was advised to use Citrucel daily and increase water intake to help with her bowel movements.\par \par She will call me if her symptoms change or worsen in any way.  Otherwise she will return to see me next year.\par \par \par Plan from 6/18/2020 - Patient with new onset constipation and rectal bleeding with associated left-sided abdominal pain.  This appears to correspond to the patient's change in diet with less fiber and decreased physical activity.  The patient is concerned as she has colon cancer in her family.\par \par I advised the patient that she should continue Citrucel and follow a high-fiber diet.  She was also encouraged to increase her fluid intake.\par \par A colonoscopy has been scheduled. The risks, benefits, alternatives, and limitations of the procedure, including the possibility of missed lesions, were explained.

## 2023-04-14 NOTE — HISTORY OF PRESENT ILLNESS
[FreeTextEntry1] : The patient has Crohn's colitis and has been on Lialda 4.8 g a day for 3 months.  Her blood work from her last visit on March 21, 2023 showed a ferritin of 10 with a 15% iron saturation and a low vitamin D of 23.7 but was otherwise normal.  6 days ago, the patient started seeing blood on the toilet paper and this is progressed to blood on the stool.  She is still moving her bowels once a day but the stools are loose with some blood and mucus.  She did not move her bowels today.  She also has been having bloating and gassiness along with epigastric pain.  She denies fever, nausea, vomiting.  She reports fatigue.  She states that her stool was dark today.  She has not had any recent travel or antibiotic use.  The patient has been having some dysuria as well.\par \par \par Note from 3/21/2023 - The patient has Crohn's colitis with skip lesions in the colon.  She is on Lialda 4.8 g a day.  She had a normal MR enterography on March 9, 2023.  The patient is feeling well.  She has 1 bowel movement a day which is mostly solid.  There is no further bleeding.  She denies melena or abdominal pain.  She also notes significantly decreased bloating.  She denies heartburn, dysphagia, nausea, vomiting.  Of note, the patient has had a dry cough and congestion for the past year.  This is worse at night primarily with laying down.\par \par \par Note from 12/13/2022 - We reviewed the evaluations done since the patient's last visit on October 18, 2022.  Blood work was normal from that day.  The patient underwent colonoscopy on November 22, 2022, which was significant for aphthous ulcers in the cecum, proximal transverse colon, proximal descending colon, and rectum.  Biopsies showed chronic active colitis with skip areas consistent with active inflammatory bowel disease.  This would be consistent with Crohn's disease.  At the time of colonoscopy, the patient was seeing blood in the stool and was having loose, mucousy stools.  Over the past 2 weeks, she has been doing better.  She reports solid stools every 2 days without melena or bright red blood per rectum.  She does get some nausea but denies abdominal pain.\par \par \par Note from 10/18/2022 - The patient is seen for the first time since her last visit on December 2, 2020.  At that time, she had had mild right-sided colitis on colonoscopy.  The biopsies appear to be more likely acute will go chronicity needed to be evaluated.  She had a CT scan on December 15, 2020 which was negative other than a small left ovarian cyst.  She saw another gastroenterologist 1 year ago with rectal bleeding.  She saw Dr. Ramirez who reportedly performed a sigmoidoscopy which showed proctitis.  The patient was given mesalamine suppositories which she uses periodically.  She gets "flareups" where she has bright red blood on the toilet paper.  Her bowel movements go from a baseline of 1 solid bowel movement a day to incomplete bowel movements every other day.  She has left-sided pain and tightness during the flareups.  She denies melena.  These events occur a couple of times a year but last weeks at a time.  She has currently been having bleeding on the toilet paper for the past 2 weeks.  She denies diarrhea, heartburn, dysphagia.  The patient's weight is stable.  The patient has not been admitted to the hospital in the past year and denies any cardiac issues.\par \par \par Note from 12/2/2020 - We reviewed the patient's ultrasound of the abdomen and pelvis performed on November 27, 2020.  The pelvic ultrasound revealed a left ovarian corpus luteal cyst and a tiny echogenic focus in the left ovary that was too small to characterize.  The patient continues to have intermittent left-sided abdominal pain which was worse 2 weeks ago.  She had been taking Citrucel daily but stopped this and states that her stools are now "bad".  She moves her bowels every 2 to 3 days and strains.  She is seeing blood with every bowel movement on the toilet paper and a little bit in the bowl.  She denies melena.  She denies fevers.\par \par \par Note from 11/4/2020 - Patient is feeling somewhat better.  She has been using Citrucel daily and is moving her bowels every 1 to 2 days, sometimes straining.  She sees bright red blood on the toilet paper only with harder stools.  She denies melena.  She notes that she has less left lower quadrant abdominal pain although it has not resolved.  She also notes that bloating and nausea have improved.  Blood work and urine testing from her last visit on October 21, 2020 were normal.  The patient has not yet had the abdominal and transvaginal ultrasound, which is scheduled for next week.  She has a history of right-sided colitis in the colon and was found to have an anal fissure at her last visit.\par \par \par Note from 10/21/2020 - The patient was last seen on August 25 at which time her symptoms had resolved.  She had had findings of acute colitis in the right colon.  Blood work at that time was normal.  She was well until 1-1/2 to 2 weeks ago when she states that her symptoms have started to return.  She now has mild left-sided abdominal pain. She has 1 solid bowel movement a day and does see some blood on the toilet paper.  She denies melena.  She denies heartburn or dysphagia.  She has also recently developed bloating and nausea after eating.  She has had mild dysuria but denies hematuria.  She denies rectal pain, burning, itching.\par \par The patient now tells me that she had abdominal and pelvic transvaginal ultrasound on June 5, 2020.  I reviewed the reports.  The abdominal ultrasound was normal although the gallbladder was under distended and therefore evaluation was limited.  The pelvic ultrasound was significant for a mild to moderate amount of fluid in the left adnexal region which could be due to a recently ruptured cyst.\par \par \par Note from 8/25/2020 - We reviewed the patient's colonoscopy performed on July 7, 2020.  Nonspecific punctate inflammation was seen in the right colon with biopsy showing acute and chronic inflammation, acute cryptitis, and crypt abscesses.  There was no evidence of chronicity and this appeared to be more consistent with an acute self-limited colitis than chronic inflammatory bowel disease.  The patient is feeling better with resolution of her abdominal pain.  She is having solid bowel movements 5-6 times a week.  She does see intermittent blood on the toilet paper.  She denies melena.\par \par \par Note from 6/18/2020 - The patient is a 25-year-old woman who was well until 2 months ago, when she developed rectal bleeding and constipation.  She states that she began to go 1 to 2 days without a bowel movement and was straining to pass her stools.  She has noted red blood streaking on the stool and on the toilet paper.  She notes that this was during the COVID-19 isolation, when she had a significant change in her exercise and her diet.  She is a  and had significant reduction in her physical activity.  She also had been a vegan but began to eat fish and eggs.  Ever since then, the patient has had blood with every bowel movement.  The patient also notes that for the past month, she has had left-sided abdominal pain which is intermittent.  She denies heartburn, dysphasia, nausea, vomiting.  The patient's weight is stable.\par \par She was put on a stool softener and prune juice initially which did not help her.  She saw a gastroenterologist 1 week ago who recommended a high-fiber diet and placed her on Citrucel 2 tablespoons twice daily.  She has been moving her bowels 1-2 times a day but still had blood on all her bowel movements until today.  The patient's pain has lessened this week.\par \par  The patient has not been admitted to the hospital in the past year and denies any cardiac issues.

## 2023-04-17 ENCOUNTER — APPOINTMENT (OUTPATIENT)
Dept: GASTROENTEROLOGY | Facility: CLINIC | Age: 28
End: 2023-04-17
Payer: COMMERCIAL

## 2023-04-17 DIAGNOSIS — N39.0 URINARY TRACT INFECTION, SITE NOT SPECIFIED: ICD-10-CM

## 2023-04-17 LAB
ALBUMIN SERPL ELPH-MCNC: 4.8 G/DL
ALP BLD-CCNC: 57 U/L
ALT SERPL-CCNC: 20 U/L
ANION GAP SERPL CALC-SCNC: 13 MMOL/L
APPEARANCE: ABNORMAL
AST SERPL-CCNC: 18 U/L
BACTERIA UR CULT: ABNORMAL
BASOPHILS # BLD AUTO: 0.07 K/UL
BASOPHILS NFR BLD AUTO: 0.9 %
BILIRUB SERPL-MCNC: 0.3 MG/DL
BILIRUBIN URINE: NEGATIVE
BLOOD URINE: NEGATIVE
BUN SERPL-MCNC: 10 MG/DL
CALCIUM SERPL-MCNC: 9.8 MG/DL
CDIFF BY PCR: NOT DETECTED
CHLORIDE SERPL-SCNC: 102 MMOL/L
CO2 SERPL-SCNC: 24 MMOL/L
COLOR: YELLOW
CREAT SERPL-MCNC: 0.57 MG/DL
CRP SERPL-MCNC: <3 MG/L
EGFR: 128 ML/MIN/1.73M2
EOSINOPHIL # BLD AUTO: 0.73 K/UL
EOSINOPHIL NFR BLD AUTO: 9 %
ERYTHROCYTE [SEDIMENTATION RATE] IN BLOOD BY WESTERGREN METHOD: 10 MM/HR
FERRITIN SERPL-MCNC: 17 NG/ML
FOLATE SERPL-MCNC: 7.5 NG/ML
GI PCR PANEL: NOT DETECTED
GLUCOSE QUALITATIVE U: NEGATIVE
GLUCOSE SERPL-MCNC: 81 MG/DL
HCT VFR BLD CALC: 38 %
HGB BLD-MCNC: 11.9 G/DL
IMM GRANULOCYTES NFR BLD AUTO: 0.4 %
IRON SATN MFR SERPL: 20 %
IRON SERPL-MCNC: 79 UG/DL
KETONES URINE: ABNORMAL
LEUKOCYTE ESTERASE URINE: ABNORMAL
LYMPHOCYTES # BLD AUTO: 1.7 K/UL
LYMPHOCYTES NFR BLD AUTO: 20.9 %
MAN DIFF?: NORMAL
MCHC RBC-ENTMCNC: 27.1 PG
MCHC RBC-ENTMCNC: 31.3 GM/DL
MCV RBC AUTO: 86.6 FL
MONOCYTES # BLD AUTO: 0.55 K/UL
MONOCYTES NFR BLD AUTO: 6.8 %
NEUTROPHILS # BLD AUTO: 5.06 K/UL
NEUTROPHILS NFR BLD AUTO: 62 %
NITRITE URINE: NEGATIVE
PH URINE: 6.5
PLATELET # BLD AUTO: 305 K/UL
POTASSIUM SERPL-SCNC: 4.2 MMOL/L
PROT SERPL-MCNC: 6.9 G/DL
PROTEIN URINE: ABNORMAL
RBC # BLD: 4.39 M/UL
RBC # FLD: 13.5 %
SODIUM SERPL-SCNC: 140 MMOL/L
SPECIFIC GRAVITY URINE: 1.02
TIBC SERPL-MCNC: 389 UG/DL
TSH SERPL-ACNC: 1.02 UIU/ML
UIBC SERPL-MCNC: 310 UG/DL
UROBILINOGEN URINE: NORMAL
VIT B12 SERPL-MCNC: 555 PG/ML
WBC # FLD AUTO: 8.14 K/UL

## 2023-04-17 PROCEDURE — 99442: CPT

## 2023-04-17 NOTE — HISTORY OF PRESENT ILLNESS
[Home] : at home, [unfilled] , at the time of the visit. [Medical Office: ([facility])___] : at the medical office located in  [Verbal consent obtained from patient] : the patient, [unfilled] [FreeTextEntry1] : The patient has Crohn's disease.  She is feeling better since she was last seen 4 days ago.  She is having 1 bowel movement a day that is solid and small.  The stools are no longer loose and there is no blood.  She still has a little bit of mucus.  Abdominal pain has resolved.  The patient's urinary symptoms have subsided, although they occur periodically.  The patient denies nausea, vomiting, heartburn, dysphagia.  We reviewed the evaluations done last week which revealed that the stool test were negative for infection.  Calprotectin is still pending.  Labs were normal including normal white blood cell count, C-reactive protein less than 3, and sed rate of 10.  The only abnormality was the urinalysis and urine culture which was consistent with an infection with Streptococcus agalactiae.\par \par \par Note from 4/13/2023 - The patient has Crohn's colitis and has been on Lialda 4.8 g a day for 3 months.  Her blood work from her last visit on March 21, 2023 showed a ferritin of 10 with a 15% iron saturation and a low vitamin D of 23.7 but was otherwise normal.  6 days ago, the patient started seeing blood on the toilet paper and this is progressed to blood on the stool.  She is still moving her bowels once a day but the stools are loose with some blood and mucus.  She did not move her bowels today.  She also has been having bloating and gassiness along with epigastric pain.  She denies fever, nausea, vomiting.  She reports fatigue.  She states that her stool was dark today.  She has not had any recent travel or antibiotic use.  The patient has been having some dysuria as well.\par \par \par Note from 3/21/2023 - The patient has Crohn's colitis with skip lesions in the colon.  She is on Lialda 4.8 g a day.  She had a normal MR enterography on March 9, 2023.  The patient is feeling well.  She has 1 bowel movement a day which is mostly solid.  There is no further bleeding.  She denies melena or abdominal pain.  She also notes significantly decreased bloating.  She denies heartburn, dysphagia, nausea, vomiting.  Of note, the patient has had a dry cough and congestion for the past year.  This is worse at night primarily with laying down.\par \par \par Note from 12/13/2022 - We reviewed the evaluations done since the patient's last visit on October 18, 2022.  Blood work was normal from that day.  The patient underwent colonoscopy on November 22, 2022, which was significant for aphthous ulcers in the cecum, proximal transverse colon, proximal descending colon, and rectum.  Biopsies showed chronic active colitis with skip areas consistent with active inflammatory bowel disease.  This would be consistent with Crohn's disease.  At the time of colonoscopy, the patient was seeing blood in the stool and was having loose, mucousy stools.  Over the past 2 weeks, she has been doing better.  She reports solid stools every 2 days without melena or bright red blood per rectum.  She does get some nausea but denies abdominal pain.\par \par \par Note from 10/18/2022 - The patient is seen for the first time since her last visit on December 2, 2020.  At that time, she had had mild right-sided colitis on colonoscopy.  The biopsies appear to be more likely acute will go chronicity needed to be evaluated.  She had a CT scan on December 15, 2020 which was negative other than a small left ovarian cyst.  She saw another gastroenterologist 1 year ago with rectal bleeding.  She saw Dr. Ramirez who reportedly performed a sigmoidoscopy which showed proctitis.  The patient was given mesalamine suppositories which she uses periodically.  She gets "flareups" where she has bright red blood on the toilet paper.  Her bowel movements go from a baseline of 1 solid bowel movement a day to incomplete bowel movements every other day.  She has left-sided pain and tightness during the flareups.  She denies melena.  These events occur a couple of times a year but last weeks at a time.  She has currently been having bleeding on the toilet paper for the past 2 weeks.  She denies diarrhea, heartburn, dysphagia.  The patient's weight is stable.  The patient has not been admitted to the hospital in the past year and denies any cardiac issues.\par \par \par Note from 12/2/2020 - We reviewed the patient's ultrasound of the abdomen and pelvis performed on November 27, 2020.  The pelvic ultrasound revealed a left ovarian corpus luteal cyst and a tiny echogenic focus in the left ovary that was too small to characterize.  The patient continues to have intermittent left-sided abdominal pain which was worse 2 weeks ago.  She had been taking Citrucel daily but stopped this and states that her stools are now "bad".  She moves her bowels every 2 to 3 days and strains.  She is seeing blood with every bowel movement on the toilet paper and a little bit in the bowl.  She denies melena.  She denies fevers.\par \par \par Note from 11/4/2020 - Patient is feeling somewhat better.  She has been using Citrucel daily and is moving her bowels every 1 to 2 days, sometimes straining.  She sees bright red blood on the toilet paper only with harder stools.  She denies melena.  She notes that she has less left lower quadrant abdominal pain although it has not resolved.  She also notes that bloating and nausea have improved.  Blood work and urine testing from her last visit on October 21, 2020 were normal.  The patient has not yet had the abdominal and transvaginal ultrasound, which is scheduled for next week.  She has a history of right-sided colitis in the colon and was found to have an anal fissure at her last visit.\par \par \par Note from 10/21/2020 - The patient was last seen on August 25 at which time her symptoms had resolved.  She had had findings of acute colitis in the right colon.  Blood work at that time was normal.  She was well until 1-1/2 to 2 weeks ago when she states that her symptoms have started to return.  She now has mild left-sided abdominal pain. She has 1 solid bowel movement a day and does see some blood on the toilet paper.  She denies melena.  She denies heartburn or dysphagia.  She has also recently developed bloating and nausea after eating.  She has had mild dysuria but denies hematuria.  She denies rectal pain, burning, itching.\par \par The patient now tells me that she had abdominal and pelvic transvaginal ultrasound on June 5, 2020.  I reviewed the reports.  The abdominal ultrasound was normal although the gallbladder was under distended and therefore evaluation was limited.  The pelvic ultrasound was significant for a mild to moderate amount of fluid in the left adnexal region which could be due to a recently ruptured cyst.\par \par \par Note from 8/25/2020 - We reviewed the patient's colonoscopy performed on July 7, 2020.  Nonspecific punctate inflammation was seen in the right colon with biopsy showing acute and chronic inflammation, acute cryptitis, and crypt abscesses.  There was no evidence of chronicity and this appeared to be more consistent with an acute self-limited colitis than chronic inflammatory bowel disease.  The patient is feeling better with resolution of her abdominal pain.  She is having solid bowel movements 5-6 times a week.  She does see intermittent blood on the toilet paper.  She denies melena.\par \par \par Note from 6/18/2020 - The patient is a 25-year-old woman who was well until 2 months ago, when she developed rectal bleeding and constipation.  She states that she began to go 1 to 2 days without a bowel movement and was straining to pass her stools.  She has noted red blood streaking on the stool and on the toilet paper.  She notes that this was during the COVID-19 isolation, when she had a significant change in her exercise and her diet.  She is a  and had significant reduction in her physical activity.  She also had been a vegan but began to eat fish and eggs.  Ever since then, the patient has had blood with every bowel movement.  The patient also notes that for the past month, she has had left-sided abdominal pain which is intermittent.  She denies heartburn, dysphasia, nausea, vomiting.  The patient's weight is stable.\par \par She was put on a stool softener and prune juice initially which did not help her.  She saw a gastroenterologist 1 week ago who recommended a high-fiber diet and placed her on Citrucel 2 tablespoons twice daily.  She has been moving her bowels 1-2 times a day but still had blood on all her bowel movements until today.  The patient's pain has lessened this week.\par \par  The patient has not been admitted to the hospital in the past year and denies any cardiac issues.

## 2023-04-17 NOTE — ASSESSMENT
[FreeTextEntry1] : Patient with Crohn's colitis who symptoms have improved.  There is no suggestion of inflammatory disease on blood work and the stool tests were negative for infection.  She has a suggestion of a Streptococcus agalactiae infection in the urine.\par \par Given the periodic symptoms, we will treat the UTI with ampicillin 500 mg 4 times daily for 7 days.\par \par We are still awaiting the stool calprotectin.\par \par Patient will continue mesalamine.  She will call me if she has any worsening of her symptoms.\par \par Otherwise, the patient will return to see me in 3 to 4 months.\par \par Patient is due for colonoscopy in November 2023.\par \par \par Plan from 4/13/2023 - Patient with Crohn's colitis who may be having a mild flare with some bleeding and looser stool along with bloating and epigastric pain.  Her stool is currently guaiac negative.\par \par Bloodwork will be sent for CBC, Chem-darren, TSH, ESR, C-reactive protein, iron studies, B12, folate, hepatitis B and C serologies, quantiferon gold.\par \par Stool studies will be sent for a GI infectious PCR panel and C. diff by PCR and fecal calprotectin.\par \par Urine will be sent for UA and C&S.\par \par We will follow-up with a telephonic visit on Monday.  If she is having any worsening symptoms without infection, we will consider prednisone.\par \par Patient is due for colonoscopy in November 2023.\par \par \par Plan from 3/21/2023 - Patient with Crohn's colitis with skip areas.  MR enterography was normal.  She is doing well on Lialda 4.8 g a day.  She has a dry cough for the past year.\par \par Bloodwork was sent for CBC, Chem-darren, TSH, ESR, C-reactive protein, iron studies, B12, folate, vitamin D, amylase, lipase.\par \par Patient will continue Lialda (mesalamine) 4.8 g a day.\par \par Patient was advised to see an ENT doctor for evaluation for possible postnasal drip.  If the evaluation is unrevealing or suggestive of reflux and she has continued symptoms, we will consider reflux as an etiology.\par \par Patient will return to see me in 6 months.  She is due for colonoscopy in November 2023.\par \par \par Plan from 12/13/2022 - Patient with aphthous ulcers in patches throughout the colon with skip areas.  Biopsies revealed chronic active inflammatory bowel disease with skip lesions consistent with Crohn's disease.  The patient has had only mild symptoms.\par \par I had a long discussion with the patient regarding the diagnosis.  She has had findings of colitis/proctitis dating back to 2020.  This is consistent with chronic inflammatory bowel disease, Crohn's type.\par \par I have started the patient on the Lialda formulation of mesalamine 4.8 g a day.\par \par The patient was sent for an MR enterography to rule out any small bowel disease.\par \par We will repeat a colonoscopy in 1 year that is November 2023.\par \par Patient will return to see me in 3 months.\par \par \par Plan from 10/18/2022 - Patient currently with bright red blood on the toilet paper and left-sided abdominal pain.  She has a history of mild right-sided colitis seen on colonoscopy in 2020.  She reportedly had a sigmoidoscopy last year that showed proctitis.  The patient requires a full evaluation to consider inflammatory bowel disease.\par \par A colonoscopy has been scheduled. The risks, benefits, alternatives, and limitations of the procedure, including the possibility of missed lesions, were explained.\par \par Blood work was sent for CBC, iron studies, sed rate, C-reactive protein.\par \par \par Plan from 12/2/2020 - Patient with ongoing complaints of left-sided abdominal pain.  She is mildly tender in the left lower quadrant.  Abdominal ultrasound was normal but pelvic ultrasound showed a left ovarian corpus luteal cyst and a very tiny focus in the left ovary that was too small to characterize.\par \par Patient was advised to follow-up with her gynecologist regarding the ovarian findings.\par \par Patient was sent for CT scan of the abdomen and pelvis given her ongoing pain and tenderness.  We need to rule out diverticulitis and colitis, especially as the patient had evidence of right-sided colitis on colonoscopy.\par \par Patient was advised that she should be using the Citrucel daily and drinking at least 64 ounces of water a day to keep her bowel movements more regular, which could potentially help her pain.\par \par We will repeat a colonoscopy in July 2021 to follow-up the finding of colitis to see if this is a chronic condition.\par \par \par Plan from 11/4/2020 - Patient with right-sided colitis on colonoscopy who notes improvement but not resolution of her left lower quadrant pain and rectal bleeding.  Her bloating and nausea have improved.  She had an anal fissure with a last visit 2 weeks ago.\par \par Patient was advised to continue using Citrucel daily.  She admits to not drinking enough water and was advised to drink at least 64 ounces of water a day.\par \par Patient will proceed with abdominal and transvaginal ultrasound, scheduled for next week.\par \par We will observe her symptoms.  The patient will return to see me in 1 month but will call me if anything worsens in the interim.\par \par We will repeat a colonoscopy in July 2021.\par \par \par Plan from 10/21/2020 - Patient with symptoms of left-sided abdominal pain, postprandial bloating and nausea, and mild rectal bleeding.  An anal fissure was found on exam.  The patient did have evidence of acute right sided colitis on previous colonoscopy but this does not seem to be the cause of the patient's symptoms.  The patient appears to have had a ruptured ovarian cyst back in June and it is possible that this is happening again.\par \par Patient was sent for repeat abdominal and transvaginal pelvic ultrasound.\par \par Blood work was sent for CBC, celiac markers, hCG.\par \par Urine was sent for UA and C&S.\par \par Patient was advised to use Citrucel daily to help with the passage of bowels and healing of the anal fissure.\par \par Patient will return to see me in 2 weeks.  If she has ongoing bloating and nausea, she may need EGD.\par \par The plan is to repeat colonoscopy in July 2021.\par \par \par Plan from 8/25/2020 - Patient with evidence of colitis in the right colon on colonoscopy.  Pathology was more consistent with an acute self-limited colitis rather than inflammatory bowel disease.  The patient's symptoms appear improved.\par \par We discussed the significance of these findings.  The patient's symptoms had lasted quite a while and the cause is unclear.\par \par Blood work was sent for CBC, CHEM pack, sed rate, C-reactive protein, and IBD serologies.\par \par We will repeat a colonoscopy in 1 year that is July 2021 2 rule out a chronic colitis.\par \par Patient was advised to use Citrucel daily and increase water intake to help with her bowel movements.\par \par She will call me if her symptoms change or worsen in any way.  Otherwise she will return to see me next year.\par \par \par Plan from 6/18/2020 - Patient with new onset constipation and rectal bleeding with associated left-sided abdominal pain.  This appears to correspond to the patient's change in diet with less fiber and decreased physical activity.  The patient is concerned as she has colon cancer in her family.\par \par I advised the patient that she should continue Citrucel and follow a high-fiber diet.  She was also encouraged to increase her fluid intake.\par \par A colonoscopy has been scheduled. The risks, benefits, alternatives, and limitations of the procedure, including the possibility of missed lesions, were explained.

## 2023-04-17 NOTE — CONSULT LETTER
[FreeTextEntry1] : Dear Dr. Jeanie Jones,\par \par I had the pleasure of seeing your patient ADARSH SAMUEL in the office today.  My office note is attached. PLEASE READ THE "ASSESSMENT" SECTION OF THE NOTE TO SEE MY IMPRESSION AND PLAN.\par \par Thank you very much for allowing me to participate in the care of your patient.\par \par Sincerely,\par \par Ke Peguero M.D., FACG, FACP\par Director, Celiac Program at [facility]\par  of Medicine, [employer] at [facility] Adjunct  of Medicine, [facility] Practice Director, [facility] at [place]\[facility] [address]\[place] [address]\par Tel: [phone]\par Email: [email or web address]\par \par \par The attached note has been created using a voice recognition system (Dragon).  There may be some misspellings and typos.  Please call my office if you have any issues or questions.

## 2023-04-19 LAB — CALPROTECTIN FECAL: 232 UG/G

## 2023-04-20 ENCOUNTER — NON-APPOINTMENT (OUTPATIENT)
Age: 28
End: 2023-04-20

## 2023-07-26 ENCOUNTER — NON-APPOINTMENT (OUTPATIENT)
Age: 28
End: 2023-07-26

## 2023-07-27 ENCOUNTER — APPOINTMENT (OUTPATIENT)
Dept: GASTROENTEROLOGY | Facility: CLINIC | Age: 28
End: 2023-07-27

## 2023-07-28 ENCOUNTER — NON-APPOINTMENT (OUTPATIENT)
Age: 28
End: 2023-07-28

## 2023-09-08 ENCOUNTER — APPOINTMENT (OUTPATIENT)
Dept: GASTROENTEROLOGY | Facility: CLINIC | Age: 28
End: 2023-09-08
Payer: COMMERCIAL

## 2023-09-08 VITALS
HEIGHT: 66 IN | SYSTOLIC BLOOD PRESSURE: 110 MMHG | OXYGEN SATURATION: 98 % | BODY MASS INDEX: 24.27 KG/M2 | HEART RATE: 103 BPM | WEIGHT: 151 LBS | DIASTOLIC BLOOD PRESSURE: 70 MMHG | TEMPERATURE: 97.5 F

## 2023-09-08 PROCEDURE — 99213 OFFICE O/P EST LOW 20 MIN: CPT | Mod: 25

## 2023-09-08 RX ORDER — SODIUM PICOSULFATE, MAGNESIUM OXIDE, AND ANHYDROUS CITRIC ACID 10; 3.5; 12 MG/160ML; G/160ML; G/160ML
10-3.5-12 MG-GM LIQUID ORAL
Qty: 1 | Refills: 0 | Status: DISCONTINUED | COMMUNITY
Start: 2022-10-18 | End: 2023-09-08

## 2023-09-08 RX ORDER — AMPICILLIN 500 MG/1
500 CAPSULE ORAL 4 TIMES DAILY
Qty: 28 | Refills: 0 | Status: DISCONTINUED | COMMUNITY
Start: 2023-04-17 | End: 2023-09-08

## 2023-09-08 NOTE — HISTORY OF PRESENT ILLNESS
[FreeTextEntry1] : The patient has Crohn's colitis.  She is on the Lialda formulation of mesalamine 4.8 g a day.  Her fecal calprotectin was elevated at 232 on April 14, 2023.  Since then, the patient was on a 5-day course of prednisone in August for asthma.  She never took the ampicillin that was given to her for UTI but she denies any UTI symptoms including dysuria or hematuria.  She feels very well denying abdominal pain.  She reports 1 solid bowel movement a day without melena or bright red blood per rectum.  She denies heartburn, dysphagia, nausea, vomiting.  The patient's weight is stable.  The patient has not been admitted to the hospital in the past year and denies any cardiac issues.   Note from 4/17/2023 - The patient has Crohn's disease.  She is feeling better since she was last seen 4 days ago.  She is having 1 bowel movement a day that is solid and small.  The stools are no longer loose and there is no blood.  She still has a little bit of mucus.  Abdominal pain has resolved.  The patient's urinary symptoms have subsided, although they occur periodically.  The patient denies nausea, vomiting, heartburn, dysphagia.  We reviewed the evaluations done last week which revealed that the stool test were negative for infection.  Calprotectin is still pending.  Labs were normal including normal white blood cell count, C-reactive protein less than 3, and sed rate of 10.  The only abnormality was the urinalysis and urine culture which was consistent with an infection with Streptococcus agalactiae.   Note from 4/13/2023 - The patient has Crohn's colitis and has been on Lialda 4.8 g a day for 3 months.  Her blood work from her last visit on March 21, 2023 showed a ferritin of 10 with a 15% iron saturation and a low vitamin D of 23.7 but was otherwise normal.  6 days ago, the patient started seeing blood on the toilet paper and this is progressed to blood on the stool.  She is still moving her bowels once a day but the stools are loose with some blood and mucus.  She did not move her bowels today.  She also has been having bloating and gassiness along with epigastric pain.  She denies fever, nausea, vomiting.  She reports fatigue.  She states that her stool was dark today.  She has not had any recent travel or antibiotic use.  The patient has been having some dysuria as well.   Note from 3/21/2023 - The patient has Crohn's colitis with skip lesions in the colon.  She is on Lialda 4.8 g a day.  She had a normal MR enterography on March 9, 2023.  The patient is feeling well.  She has 1 bowel movement a day which is mostly solid.  There is no further bleeding.  She denies melena or abdominal pain.  She also notes significantly decreased bloating.  She denies heartburn, dysphagia, nausea, vomiting.  Of note, the patient has had a dry cough and congestion for the past year.  This is worse at night primarily with laying down.   Note from 12/13/2022 - We reviewed the evaluations done since the patient's last visit on October 18, 2022.  Blood work was normal from that day.  The patient underwent colonoscopy on November 22, 2022, which was significant for aphthous ulcers in the cecum, proximal transverse colon, proximal descending colon, and rectum.  Biopsies showed chronic active colitis with skip areas consistent with active inflammatory bowel disease.  This would be consistent with Crohn's disease.  At the time of colonoscopy, the patient was seeing blood in the stool and was having loose, mucousy stools.  Over the past 2 weeks, she has been doing better.  She reports solid stools every 2 days without melena or bright red blood per rectum.  She does get some nausea but denies abdominal pain.   Note from 10/18/2022 - The patient is seen for the first time since her last visit on December 2, 2020.  At that time, she had had mild right-sided colitis on colonoscopy.  The biopsies appear to be more likely acute will go chronicity needed to be evaluated.  She had a CT scan on December 15, 2020 which was negative other than a small left ovarian cyst.  She saw another gastroenterologist 1 year ago with rectal bleeding.  She saw Dr. Ramirez who reportedly performed a sigmoidoscopy which showed proctitis.  The patient was given mesalamine suppositories which she uses periodically.  She gets "flareups" where she has bright red blood on the toilet paper.  Her bowel movements go from a baseline of 1 solid bowel movement a day to incomplete bowel movements every other day.  She has left-sided pain and tightness during the flareups.  She denies melena.  These events occur a couple of times a year but last weeks at a time.  She has currently been having bleeding on the toilet paper for the past 2 weeks.  She denies diarrhea, heartburn, dysphagia.  The patient's weight is stable.  The patient has not been admitted to the hospital in the past year and denies any cardiac issues.   Note from 12/2/2020 - We reviewed the patient's ultrasound of the abdomen and pelvis performed on November 27, 2020.  The pelvic ultrasound revealed a left ovarian corpus luteal cyst and a tiny echogenic focus in the left ovary that was too small to characterize.  The patient continues to have intermittent left-sided abdominal pain which was worse 2 weeks ago.  She had been taking Citrucel daily but stopped this and states that her stools are now "bad".  She moves her bowels every 2 to 3 days and strains.  She is seeing blood with every bowel movement on the toilet paper and a little bit in the bowl.  She denies melena.  She denies fevers.   Note from 11/4/2020 - Patient is feeling somewhat better.  She has been using Citrucel daily and is moving her bowels every 1 to 2 days, sometimes straining.  She sees bright red blood on the toilet paper only with harder stools.  She denies melena.  She notes that she has less left lower quadrant abdominal pain although it has not resolved.  She also notes that bloating and nausea have improved.  Blood work and urine testing from her last visit on October 21, 2020 were normal.  The patient has not yet had the abdominal and transvaginal ultrasound, which is scheduled for next week.  She has a history of right-sided colitis in the colon and was found to have an anal fissure at her last visit.   Note from 10/21/2020 - The patient was last seen on August 25 at which time her symptoms had resolved.  She had had findings of acute colitis in the right colon.  Blood work at that time was normal.  She was well until 1-1/2 to 2 weeks ago when she states that her symptoms have started to return.  She now has mild left-sided abdominal pain. She has 1 solid bowel movement a day and does see some blood on the toilet paper.  She denies melena.  She denies heartburn or dysphagia.  She has also recently developed bloating and nausea after eating.  She has had mild dysuria but denies hematuria.  She denies rectal pain, burning, itching.  The patient now tells me that she had abdominal and pelvic transvaginal ultrasound on June 5, 2020.  I reviewed the reports.  The abdominal ultrasound was normal although the gallbladder was under distended and therefore evaluation was limited.  The pelvic ultrasound was significant for a mild to moderate amount of fluid in the left adnexal region which could be due to a recently ruptured cyst.   Note from 8/25/2020 - We reviewed the patient's colonoscopy performed on July 7, 2020.  Nonspecific punctate inflammation was seen in the right colon with biopsy showing acute and chronic inflammation, acute cryptitis, and crypt abscesses.  There was no evidence of chronicity and this appeared to be more consistent with an acute self-limited colitis than chronic inflammatory bowel disease.  The patient is feeling better with resolution of her abdominal pain.  She is having solid bowel movements 5-6 times a week.  She does see intermittent blood on the toilet paper.  She denies melena.   Note from 6/18/2020 - The patient is a 25-year-old woman who was well until 2 months ago, when she developed rectal bleeding and constipation.  She states that she began to go 1 to 2 days without a bowel movement and was straining to pass her stools.  She has noted red blood streaking on the stool and on the toilet paper.  She notes that this was during the COVID-19 isolation, when she had a significant change in her exercise and her diet.  She is a  and had significant reduction in her physical activity.  She also had been a vegan but began to eat fish and eggs.  Ever since then, the patient has had blood with every bowel movement.  The patient also notes that for the past month, she has had left-sided abdominal pain which is intermittent.  She denies heartburn, dysphasia, nausea, vomiting.  The patient's weight is stable.  She was put on a stool softener and prune juice initially which did not help her.  She saw a gastroenterologist 1 week ago who recommended a high-fiber diet and placed her on Citrucel 2 tablespoons twice daily.  She has been moving her bowels 1-2 times a day but still had blood on all her bowel movements until today.  The patient's pain has lessened this week.   The patient has not been admitted to the hospital in the past year and denies any cardiac issues.

## 2023-09-08 NOTE — ASSESSMENT
[FreeTextEntry1] : Patient with Crohn's colitis who is doing very well clinically on Lialda 4.8 g a day.  Bloodwork was sent for CBC, Chem-darren, TSH, ESR, C-reactive protein, iron studies, B12, folate, vitamin D, hepatitis B and C serologies, quantiferon gold.  A colonoscopy has been scheduled. The risks, benefits, alternatives, and limitations of the procedure, including the possibility of missed lesions, were explained.   Plan from 4/17/2023 - Patient with Crohn's colitis who symptoms have improved.  There is no suggestion of inflammatory disease on blood work and the stool tests were negative for infection.  She has a suggestion of a Streptococcus agalactiae infection in the urine.  Given the periodic symptoms, we will treat the UTI with ampicillin 500 mg 4 times daily for 7 days.  We are still awaiting the stool calprotectin.  Patient will continue mesalamine.  She will call me if she has any worsening of her symptoms.  Otherwise, the patient will return to see me in 3 to 4 months.  Patient is due for colonoscopy in November 2023.   Plan from 4/13/2023 - Patient with Crohn's colitis who may be having a mild flare with some bleeding and looser stool along with bloating and epigastric pain.  Her stool is currently guaiac negative.  Bloodwork will be sent for CBC, Chem-darren, TSH, ESR, C-reactive protein, iron studies, B12, folate, hepatitis B and C serologies, quantiferon gold.  Stool studies will be sent for a GI infectious PCR panel and C. diff by PCR and fecal calprotectin.  Urine will be sent for UA and C&S.  We will follow-up with a telephonic visit on Monday.  If she is having any worsening symptoms without infection, we will consider prednisone.  Patient is due for colonoscopy in November 2023.   Plan from 3/21/2023 - Patient with Crohn's colitis with skip areas.  MR enterography was normal.  She is doing well on Lialda 4.8 g a day.  She has a dry cough for the past year.  Bloodwork was sent for CBC, Chem-darren, TSH, ESR, C-reactive protein, iron studies, B12, folate, vitamin D, amylase, lipase.  Patient will continue Lialda (mesalamine) 4.8 g a day.  Patient was advised to see an ENT doctor for evaluation for possible postnasal drip.  If the evaluation is unrevealing or suggestive of reflux and she has continued symptoms, we will consider reflux as an etiology.  Patient will return to see me in 6 months.  She is due for colonoscopy in November 2023.   Plan from 12/13/2022 - Patient with aphthous ulcers in patches throughout the colon with skip areas.  Biopsies revealed chronic active inflammatory bowel disease with skip lesions consistent with Crohn's disease.  The patient has had only mild symptoms.  I had a long discussion with the patient regarding the diagnosis.  She has had findings of colitis/proctitis dating back to 2020.  This is consistent with chronic inflammatory bowel disease, Crohn's type.  I have started the patient on the Lialda formulation of mesalamine 4.8 g a day.  The patient was sent for an MR enterography to rule out any small bowel disease.  We will repeat a colonoscopy in 1 year that is November 2023.  Patient will return to see me in 3 months.   Plan from 10/18/2022 - Patient currently with bright red blood on the toilet paper and left-sided abdominal pain.  She has a history of mild right-sided colitis seen on colonoscopy in 2020.  She reportedly had a sigmoidoscopy last year that showed proctitis.  The patient requires a full evaluation to consider inflammatory bowel disease.  A colonoscopy has been scheduled. The risks, benefits, alternatives, and limitations of the procedure, including the possibility of missed lesions, were explained.  Blood work was sent for CBC, iron studies, sed rate, C-reactive protein.   Plan from 12/2/2020 - Patient with ongoing complaints of left-sided abdominal pain.  She is mildly tender in the left lower quadrant.  Abdominal ultrasound was normal but pelvic ultrasound showed a left ovarian corpus luteal cyst and a very tiny focus in the left ovary that was too small to characterize.  Patient was advised to follow-up with her gynecologist regarding the ovarian findings.  Patient was sent for CT scan of the abdomen and pelvis given her ongoing pain and tenderness.  We need to rule out diverticulitis and colitis, especially as the patient had evidence of right-sided colitis on colonoscopy.  Patient was advised that she should be using the Citrucel daily and drinking at least 64 ounces of water a day to keep her bowel movements more regular, which could potentially help her pain.  We will repeat a colonoscopy in July 2021 to follow-up the finding of colitis to see if this is a chronic condition.   Plan from 11/4/2020 - Patient with right-sided colitis on colonoscopy who notes improvement but not resolution of her left lower quadrant pain and rectal bleeding.  Her bloating and nausea have improved.  She had an anal fissure with a last visit 2 weeks ago.  Patient was advised to continue using Citrucel daily.  She admits to not drinking enough water and was advised to drink at least 64 ounces of water a day.  Patient will proceed with abdominal and transvaginal ultrasound, scheduled for next week.  We will observe her symptoms.  The patient will return to see me in 1 month but will call me if anything worsens in the interim.  We will repeat a colonoscopy in July 2021.   Plan from 10/21/2020 - Patient with symptoms of left-sided abdominal pain, postprandial bloating and nausea, and mild rectal bleeding.  An anal fissure was found on exam.  The patient did have evidence of acute right sided colitis on previous colonoscopy but this does not seem to be the cause of the patient's symptoms.  The patient appears to have had a ruptured ovarian cyst back in June and it is possible that this is happening again.  Patient was sent for repeat abdominal and transvaginal pelvic ultrasound.  Blood work was sent for CBC, celiac markers, hCG.  Urine was sent for UA and C&S.  Patient was advised to use Citrucel daily to help with the passage of bowels and healing of the anal fissure.  Patient will return to see me in 2 weeks.  If she has ongoing bloating and nausea, she may need EGD.  The plan is to repeat colonoscopy in July 2021.   Plan from 8/25/2020 - Patient with evidence of colitis in the right colon on colonoscopy.  Pathology was more consistent with an acute self-limited colitis rather than inflammatory bowel disease.  The patient's symptoms appear improved.  We discussed the significance of these findings.  The patient's symptoms had lasted quite a while and the cause is unclear.  Blood work was sent for CBC, CHEM pack, sed rate, C-reactive protein, and IBD serologies.  We will repeat a colonoscopy in 1 year that is July 2021 2 rule out a chronic colitis.  Patient was advised to use Citrucel daily and increase water intake to help with her bowel movements.  She will call me if her symptoms change or worsen in any way.  Otherwise she will return to see me next year.   Plan from 6/18/2020 - Patient with new onset constipation and rectal bleeding with associated left-sided abdominal pain.  This appears to correspond to the patient's change in diet with less fiber and decreased physical activity.  The patient is concerned as she has colon cancer in her family.  I advised the patient that she should continue Citrucel and follow a high-fiber diet.  She was also encouraged to increase her fluid intake.  A colonoscopy has been scheduled. The risks, benefits, alternatives, and limitations of the procedure, including the possibility of missed lesions, were explained.

## 2023-09-08 NOTE — CONSULT LETTER
[FreeTextEntry1] : Dear Dr. Jeanie Jones,\par  \par  I had the pleasure of seeing your patient ADARSH SAMUEL in the office today.  My office note is attached. PLEASE READ THE "ASSESSMENT" SECTION OF THE NOTE TO SEE MY IMPRESSION AND PLAN.\par  \par  Thank you very much for allowing me to participate in the care of your patient.\par  \par  Sincerely,\par  \par  Ke Peguero M.D., FACG, FACP\par  Director, Celiac Program at [facility]\par   of Medicine, [employer] at [facility]  Adjunct  of Medicine, [facility]  Practice Director, [facility] Gastro[facility]ology at [place]\[facility]  [address] - [address]\[place] [address]\par  Tel: [phone]\par  Email: [email or web address]  \par  \par  The attached note has been created using a voice recognition system (Dragon).  There may be some misspellings and typos.  Please call my office if you have any issues or questions.

## 2023-09-13 LAB
25(OH)D3 SERPL-MCNC: 22.9 NG/ML
ALBUMIN SERPL ELPH-MCNC: 4.8 G/DL
ALP BLD-CCNC: 64 U/L
ALT SERPL-CCNC: 14 U/L
ANION GAP SERPL CALC-SCNC: 12 MMOL/L
AST SERPL-CCNC: 16 U/L
BASOPHILS # BLD AUTO: 0.07 K/UL
BASOPHILS NFR BLD AUTO: 1.1 %
BILIRUB SERPL-MCNC: 0.3 MG/DL
BUN SERPL-MCNC: 10 MG/DL
CALCIUM SERPL-MCNC: 9.6 MG/DL
CHLORIDE SERPL-SCNC: 104 MMOL/L
CO2 SERPL-SCNC: 25 MMOL/L
CREAT SERPL-MCNC: 0.67 MG/DL
CRP SERPL-MCNC: <3 MG/L
EGFR: 123 ML/MIN/1.73M2
EOSINOPHIL # BLD AUTO: 0.87 K/UL
EOSINOPHIL NFR BLD AUTO: 13.6 %
ERYTHROCYTE [SEDIMENTATION RATE] IN BLOOD BY WESTERGREN METHOD: 12 MM/HR
FERRITIN SERPL-MCNC: 15 NG/ML
FOLATE SERPL-MCNC: 8 NG/ML
GLUCOSE SERPL-MCNC: 73 MG/DL
HBV CORE IGG+IGM SER QL: NONREACTIVE
HBV SURFACE AB SER QL: REACTIVE
HBV SURFACE AG SER QL: NONREACTIVE
HCT VFR BLD CALC: 42.6 %
HCV AB SER QL: NONREACTIVE
HCV S/CO RATIO: 0.07 S/CO
HGB BLD-MCNC: 13 G/DL
IMM GRANULOCYTES NFR BLD AUTO: 0.3 %
IRON SATN MFR SERPL: 25 %
IRON SERPL-MCNC: 89 UG/DL
LYMPHOCYTES # BLD AUTO: 1.67 K/UL
LYMPHOCYTES NFR BLD AUTO: 26.1 %
M TB IFN-G BLD-IMP: NEGATIVE
MAN DIFF?: NORMAL
MCHC RBC-ENTMCNC: 27.7 PG
MCHC RBC-ENTMCNC: 30.5 GM/DL
MCV RBC AUTO: 90.6 FL
MONOCYTES # BLD AUTO: 0.4 K/UL
MONOCYTES NFR BLD AUTO: 6.3 %
NEUTROPHILS # BLD AUTO: 3.36 K/UL
NEUTROPHILS NFR BLD AUTO: 52.6 %
PLATELET # BLD AUTO: 353 K/UL
POTASSIUM SERPL-SCNC: 3.9 MMOL/L
PROT SERPL-MCNC: 7.3 G/DL
QUANTIFERON TB PLUS MITOGEN MINUS NIL: 6.74 IU/ML
QUANTIFERON TB PLUS NIL: 0.01 IU/ML
QUANTIFERON TB PLUS TB1 MINUS NIL: 0 IU/ML
QUANTIFERON TB PLUS TB2 MINUS NIL: 0 IU/ML
RBC # BLD: 4.7 M/UL
RBC # FLD: 13.8 %
SODIUM SERPL-SCNC: 141 MMOL/L
TIBC SERPL-MCNC: 361 UG/DL
TSH SERPL-ACNC: 1.01 UIU/ML
UIBC SERPL-MCNC: 272 UG/DL
VIT B12 SERPL-MCNC: 633 PG/ML
WBC # FLD AUTO: 6.39 K/UL

## 2023-09-19 ENCOUNTER — APPOINTMENT (OUTPATIENT)
Dept: GASTROENTEROLOGY | Facility: CLINIC | Age: 28
End: 2023-09-19

## 2023-10-12 ENCOUNTER — APPOINTMENT (OUTPATIENT)
Dept: PULMONOLOGY | Facility: CLINIC | Age: 28
End: 2023-10-12
Payer: COMMERCIAL

## 2023-10-12 VITALS
RESPIRATION RATE: 18 BRPM | BODY MASS INDEX: 22.5 KG/M2 | SYSTOLIC BLOOD PRESSURE: 120 MMHG | OXYGEN SATURATION: 96 % | WEIGHT: 140 LBS | HEART RATE: 85 BPM | TEMPERATURE: 97.8 F | HEIGHT: 66 IN | DIASTOLIC BLOOD PRESSURE: 70 MMHG

## 2023-10-12 DIAGNOSIS — U07.1 COVID-19: ICD-10-CM

## 2023-10-12 DIAGNOSIS — Z87.09 PERSONAL HISTORY OF OTHER DISEASES OF THE RESPIRATORY SYSTEM: ICD-10-CM

## 2023-10-12 DIAGNOSIS — Z83.511 FAMILY HISTORY OF GLAUCOMA: ICD-10-CM

## 2023-10-12 DIAGNOSIS — Z87.898 PERSONAL HISTORY OF OTHER SPECIFIED CONDITIONS: ICD-10-CM

## 2023-10-12 DIAGNOSIS — J30.81 ALLERGIC RHINITIS DUE TO ANIMAL (CAT) (DOG) HAIR AND DANDER: ICD-10-CM

## 2023-10-12 DIAGNOSIS — Z78.9 OTHER SPECIFIED HEALTH STATUS: ICD-10-CM

## 2023-10-12 DIAGNOSIS — Z88.9 ALLERGY STATUS TO UNSPECIFIED DRUGS, MEDICAMENTS AND BIOLOGICAL SUBSTANCES: ICD-10-CM

## 2023-10-12 DIAGNOSIS — Z87.19 PERSONAL HISTORY OF OTHER DISEASES OF THE DIGESTIVE SYSTEM: ICD-10-CM

## 2023-10-12 DIAGNOSIS — Z86.16 PERSONAL HISTORY OF COVID-19: ICD-10-CM

## 2023-10-12 DIAGNOSIS — J30.89 OTHER ALLERGIC RHINITIS: ICD-10-CM

## 2023-10-12 PROCEDURE — 99204 OFFICE O/P NEW MOD 45 MIN: CPT | Mod: 25

## 2023-10-12 PROCEDURE — 95012 NITRIC OXIDE EXP GAS DETER: CPT

## 2023-10-12 PROCEDURE — 71046 X-RAY EXAM CHEST 2 VIEWS: CPT

## 2023-10-12 PROCEDURE — 94727 GAS DIL/WSHOT DETER LNG VOL: CPT

## 2023-10-12 PROCEDURE — ZZZZZ: CPT

## 2023-10-12 PROCEDURE — 94010 BREATHING CAPACITY TEST: CPT

## 2023-10-12 PROCEDURE — 94729 DIFFUSING CAPACITY: CPT

## 2023-10-12 PROCEDURE — 94618 PULMONARY STRESS TESTING: CPT

## 2023-10-12 RX ORDER — OLOPATADINE HYDROCHLORIDE 665 UG/1
0.6 SPRAY, METERED NASAL
Qty: 3 | Refills: 1 | Status: ACTIVE | COMMUNITY
Start: 2023-10-12 | End: 1900-01-01

## 2023-11-29 ENCOUNTER — APPOINTMENT (OUTPATIENT)
Dept: FAMILY MEDICINE | Facility: CLINIC | Age: 28
End: 2023-11-29
Payer: COMMERCIAL

## 2023-11-29 VITALS
BODY MASS INDEX: 24.59 KG/M2 | DIASTOLIC BLOOD PRESSURE: 80 MMHG | OXYGEN SATURATION: 97 % | WEIGHT: 153 LBS | RESPIRATION RATE: 16 BRPM | HEIGHT: 66 IN | SYSTOLIC BLOOD PRESSURE: 120 MMHG | TEMPERATURE: 98.1 F | HEART RATE: 92 BPM

## 2023-11-29 DIAGNOSIS — Z87.898 PERSONAL HISTORY OF OTHER SPECIFIED CONDITIONS: ICD-10-CM

## 2023-11-29 DIAGNOSIS — F41.9 ANXIETY DISORDER, UNSPECIFIED: ICD-10-CM

## 2023-11-29 DIAGNOSIS — Z00.00 ENCOUNTER FOR GENERAL ADULT MEDICAL EXAMINATION W/OUT ABNORMAL FINDINGS: ICD-10-CM

## 2023-11-29 DIAGNOSIS — Z86.39 PERSONAL HISTORY OF OTHER ENDOCRINE, NUTRITIONAL AND METABOLIC DISEASE: ICD-10-CM

## 2023-11-29 DIAGNOSIS — Z01.818 ENCOUNTER FOR OTHER PREPROCEDURAL EXAMINATION: ICD-10-CM

## 2023-11-29 DIAGNOSIS — Z11.3 ENCOUNTER FOR SCREENING FOR INFECTIONS WITH A PREDOMINANTLY SEXUAL MODE OF TRANSMISSION: ICD-10-CM

## 2023-11-29 DIAGNOSIS — G89.29 DORSALGIA, UNSPECIFIED: ICD-10-CM

## 2023-11-29 DIAGNOSIS — M54.9 DORSALGIA, UNSPECIFIED: ICD-10-CM

## 2023-11-29 PROCEDURE — 99385 PREV VISIT NEW AGE 18-39: CPT | Mod: 25

## 2023-11-29 RX ORDER — ALBUTEROL SULFATE 90 UG/1
108 (90 BASE) INHALANT RESPIRATORY (INHALATION)
Refills: 0 | Status: ACTIVE | COMMUNITY

## 2023-12-02 ENCOUNTER — TRANSCRIPTION ENCOUNTER (OUTPATIENT)
Age: 28
End: 2023-12-02

## 2023-12-02 LAB
25(OH)D3 SERPL-MCNC: 17.8 NG/ML
ALBUMIN SERPL ELPH-MCNC: 4.6 G/DL
ALP BLD-CCNC: 62 U/L
ALT SERPL-CCNC: 14 U/L
ANION GAP SERPL CALC-SCNC: 14 MMOL/L
AST SERPL-CCNC: 15 U/L
BASOPHILS # BLD AUTO: 0.11 K/UL
BASOPHILS NFR BLD AUTO: 1.6 %
BILIRUB SERPL-MCNC: 0.2 MG/DL
BUN SERPL-MCNC: 9 MG/DL
CALCIUM SERPL-MCNC: 9.7 MG/DL
CHLORIDE SERPL-SCNC: 104 MMOL/L
CHOLEST SERPL-MCNC: 238 MG/DL
CO2 SERPL-SCNC: 20 MMOL/L
CREAT SERPL-MCNC: 0.63 MG/DL
EGFR: 125 ML/MIN/1.73M2
EOSINOPHIL # BLD AUTO: 1.14 K/UL
EOSINOPHIL NFR BLD AUTO: 16.7 %
ESTIMATED AVERAGE GLUCOSE: 103 MG/DL
GLUCOSE SERPL-MCNC: 87 MG/DL
HBA1C MFR BLD HPLC: 5.2 %
HBV SURFACE AG SER QL: NONREACTIVE
HCT VFR BLD CALC: 39 %
HCV AB SER QL: NONREACTIVE
HCV S/CO RATIO: 0.07 S/CO
HDLC SERPL-MCNC: 59 MG/DL
HGB BLD-MCNC: 12.6 G/DL
IMM GRANULOCYTES NFR BLD AUTO: 0.1 %
LDLC SERPL CALC-MCNC: 171 MG/DL
LYMPHOCYTES # BLD AUTO: 1.83 K/UL
LYMPHOCYTES NFR BLD AUTO: 26.8 %
MAN DIFF?: NORMAL
MCHC RBC-ENTMCNC: 28.7 PG
MCHC RBC-ENTMCNC: 32.3 GM/DL
MCV RBC AUTO: 88.8 FL
MONOCYTES # BLD AUTO: 0.44 K/UL
MONOCYTES NFR BLD AUTO: 6.5 %
NEUTROPHILS # BLD AUTO: 3.29 K/UL
NEUTROPHILS NFR BLD AUTO: 48.3 %
NONHDLC SERPL-MCNC: 179 MG/DL
PLATELET # BLD AUTO: 324 K/UL
POTASSIUM SERPL-SCNC: 4.3 MMOL/L
PROT SERPL-MCNC: 7.1 G/DL
RBC # BLD: 4.39 M/UL
RBC # FLD: 13.2 %
SODIUM SERPL-SCNC: 138 MMOL/L
T PALLIDUM AB SER QL IA: NEGATIVE
TRIGL SERPL-MCNC: 50 MG/DL
TSH SERPL-ACNC: 1.45 UIU/ML
VIT B12 SERPL-MCNC: 566 PG/ML
WBC # FLD AUTO: 6.82 K/UL

## 2023-12-04 LAB — HIV1+2 AB SPEC QL IA.RAPID: NONREACTIVE

## 2023-12-11 ENCOUNTER — APPOINTMENT (OUTPATIENT)
Dept: HEART AND VASCULAR | Facility: CLINIC | Age: 28
End: 2023-12-11
Payer: COMMERCIAL

## 2023-12-11 PROCEDURE — 81050 URINALYSIS VOLUME MEASURE: CPT

## 2023-12-14 LAB
APPEARANCE: CLEAR
BACTERIA: ABNORMAL /HPF
BILIRUBIN URINE: NEGATIVE
BLOOD URINE: NEGATIVE
C TRACH RRNA SPEC QL NAA+PROBE: NOT DETECTED
CAST: 0 /LPF
COLOR: YELLOW
EPITHELIAL CELLS: 2 /HPF
GLUCOSE QUALITATIVE U: NEGATIVE MG/DL
KETONES URINE: NEGATIVE MG/DL
LEUKOCYTE ESTERASE URINE: NEGATIVE
MICROSCOPIC-UA: NORMAL
N GONORRHOEA RRNA SPEC QL NAA+PROBE: NOT DETECTED
NITRITE URINE: NEGATIVE
PH URINE: 6.5
PROTEIN URINE: NEGATIVE MG/DL
RED BLOOD CELLS URINE: 1 /HPF
SOURCE AMPLIFICATION: NORMAL
SPECIFIC GRAVITY URINE: 1.01
UROBILINOGEN URINE: 0.2 MG/DL
WHITE BLOOD CELLS URINE: 1 /HPF

## 2023-12-18 ENCOUNTER — APPOINTMENT (OUTPATIENT)
Dept: PULMONOLOGY | Facility: CLINIC | Age: 28
End: 2023-12-18

## 2024-02-15 ENCOUNTER — APPOINTMENT (OUTPATIENT)
Dept: GASTROENTEROLOGY | Facility: AMBULATORY MEDICAL SERVICES | Age: 29
End: 2024-02-15
Payer: MEDICAID

## 2024-02-15 PROCEDURE — 43239 EGD BIOPSY SINGLE/MULTIPLE: CPT | Mod: 59

## 2024-02-15 PROCEDURE — 45380 COLONOSCOPY AND BIOPSY: CPT

## 2024-02-24 ENCOUNTER — NON-APPOINTMENT (OUTPATIENT)
Age: 29
End: 2024-02-24

## 2024-02-27 ENCOUNTER — APPOINTMENT (OUTPATIENT)
Dept: PULMONOLOGY | Facility: CLINIC | Age: 29
End: 2024-02-27
Payer: MEDICAID

## 2024-02-27 VITALS
SYSTOLIC BLOOD PRESSURE: 124 MMHG | RESPIRATION RATE: 16 BRPM | DIASTOLIC BLOOD PRESSURE: 62 MMHG | TEMPERATURE: 97.4 F | HEIGHT: 66 IN | BODY MASS INDEX: 24.11 KG/M2 | HEART RATE: 92 BPM | WEIGHT: 150 LBS | OXYGEN SATURATION: 95 %

## 2024-02-27 DIAGNOSIS — Z72.820 SLEEP DEPRIVATION: ICD-10-CM

## 2024-02-27 PROCEDURE — 99214 OFFICE O/P EST MOD 30 MIN: CPT

## 2024-02-27 RX ORDER — LEVALBUTEROL HYDROCHLORIDE 0.63 MG/3ML
0.63 SOLUTION RESPIRATORY (INHALATION)
Qty: 120 | Refills: 2 | Status: ACTIVE | COMMUNITY
Start: 2024-02-27 | End: 1900-01-01

## 2024-02-27 RX ORDER — ALBUTEROL SULFATE 2.5 MG/3ML
(2.5 MG/3ML) SOLUTION RESPIRATORY (INHALATION)
Qty: 120 | Refills: 3 | Status: ACTIVE | COMMUNITY
Start: 2024-02-27 | End: 1900-01-01

## 2024-02-27 NOTE — PROCEDURE
[FreeTextEntry1] : CBC revealed WBC 9.5, HGB 12.4, HCT 37.2, , EOS 7.4%  CXR (2/25/24) reveals a normal sized heart; no evidence of infiltrate or effusion--a normal appearing chest radiograph

## 2024-02-27 NOTE — HISTORY OF PRESENT ILLNESS
[TextBox_4] : Ms. SAMUEL is a 29 year old female with a history of anal fissure, Crohn's disease, allergies, anxiety, asthma presenting to the office today for a follow-up pulmonary evaluation. 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 She now has a lingering productive cough and wheezing. She was given a Medrol dose pack and she took pseudoephedrine. She's been nebulizing with albuterol -she notes she has minor gastritis s/p endoscopy, and her colonoscopy revealed mild ulcers -She denies urticaria   -she denies any headaches, nausea, emesis, fever, chills, sweats, chest pain, chest pressure, palpitations, diarrhea, constipation, dysphagia, vertigo, arthralgias, myalgias, leg swelling, itchy eyes, itchy ears, heartburn, reflux, or sour taste in the mouth.

## 2024-02-27 NOTE — ASSESSMENT
[FreeTextEntry1] :  Ms. SAMUEL is a 29 year old female with a history of anal fissure, Crohn's disease, allergies, anxiety, asthma, poor sleep presenting to the office today for a follow-up pulmonary evaluation for SOB, allergic asthma, allergies, poor sleep/?JOSS- active asthma   Her shortness of breath is multifactorial due to: -poor mechanics of breathing -out of shape -pulmonary disease   -asthma   problem 1: Moderate Allergic Asthma -continue Breo Ellipta 200 at 1 inhalation QAM (2ND) -add Xopenex 0.63 via nebulizer q6H prn (1ST) -continue Albuterol via inhaler 2 puffs up to Q6H, pre-exercise PRN  -add Prednisone 20 mg x 7 days, 10 mg x 7 days  -Information sheet given to the patient to be reviewed, this medication is never to be used without consulting the prescribing physician. Proper dietary restraint is necessary specifically salt containing foods, if any reaction may occur should be reported.  -Asthma is believed to be caused by inherited (genetic) and environmental factor, but its exact cause is unknown. Asthma may be triggered by allergens, lung infections, or irritants in the air. Asthma triggers are different for each person. -Inhaler technique reviewed as well as oral hygiene techniques reviewed with patient. Avoidance of cold air, extremes of temperature, rescue inhaler should be used before exercise. Order of medication reviewed with patient. 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 Six biologic agents are FDA-approved to treat moderate-to-severe asthma by targeting various cytokines and cell surface receptors involved in the inflammatory process in asthma. Several biologic therapies are also indicated for other inflammatory conditions marked by high eosinophils. Efficacy of biologic therapy should be assessed after 4 to 6 months of treatment.   problem 2: Allergies/Sinus -continue Olopatadine 0.6% 1 sniff BID -complete blood work: asthma panel, food IgE panel, IgE level, eosinophil level, vitamin D level -recommended to get Roomba Vacuum  -Environmental measures for allergies were encouraged including mattress and pillow covers, air purifier, and environmental controls.   Problem 4: poor sleep/?JOSS (elevated Mallampati class, poor quality sleep) -complete home sleep study ([place]) -recommended Jackson salt bath [facility] -Sleep apnea is associated with adverse clinical consequences which can affect most organ systems. Cardiovascular disease risk includes arrhythmias, atrial fibrillation, hypertension, coronary artery disease, and stroke. Metabolic disorders include diabetes type 2, non-alcoholic fatty liver disease. Mood disorder especially depression; and cognitive decline especially in the elderly. Associations with chronic reflux/Barretts esophagus some but not all inclusive. -Reasons include arousal consistent with hypopnea; respiratory events most prominent in REM sleep or supine position; therefore sleep staging and body position are important for accurate diagnosis and estimation of AHI.   problem 5 : poor breathing mechanics -Proper breathing techniques were reviewed with an emphasis of exhalation. Patient instructed to breathe in for 1 second and out for 4 seconds. Patient was encouraged to not talk while walking.   problem 6 : out of shape -Weight loss, exercise, and diet control were discussed and are highly encouraged. Treatment options are given such as, aqua therapy, and contacting a nutritionist. Recommended to use the elliptical, stationary bike, less use of treadmill.   problem 7 : health maintenance -recommended yearly flu shot after October 15 (s/p 2023) -recommended strep pneumonia vaccines: Prevnar-20 vaccine, followed by Pneumo vaccine 23 one year following after 65 years old. -recommended early intervention for Upper Respiratory Infections (URIs) -recommended regular osteoporosis evaluations -recommended early dermatological evaluations -recommended after the age of 50 to the age of 70, colonoscopy every 5 years   F/P in 3-4 months  She is encouraged to call with any changes, concerns, or questions

## 2024-02-27 NOTE — ADDENDUM
[FreeTextEntry1] : Documented by Lyric Gallagher acting as a scribe for Dr. Levy Villegas on 02/27/2024. All medical record entries made by the Scribe were at my, Dr. Levy Villegas's, direction and personally dictated by me on 02/27/2024. I have reviewed the chart and agree that the record accurately reflects my personal performance of the history, physical exam, assessment and plan. I have also personally directed, reviewed, and agree with the discharge instructions.

## 2024-02-27 NOTE — REASON FOR VISIT
[Follow-Up] : a follow-up visit [Parent] : parent [TextBox_44] : SOB, allergic asthma, allergies, poor sleep/?JOSS.

## 2024-02-27 NOTE — PHYSICAL EXAM
[No Acute Distress] : no acute distress [Normal Oropharynx] : normal oropharynx [Normal Appearance] : normal appearance [No Neck Mass] : no neck mass [Normal Rate/Rhythm] : normal rate/rhythm [Normal S1, S2] : normal s1, s2 [No Murmurs] : no murmurs [No Resp Distress] : no resp distress [No Abnormalities] : no abnormalities [Benign] : benign [Normal Gait] : normal gait [No Clubbing] : no clubbing [No Cyanosis] : no cyanosis [No Edema] : no edema [FROM] : FROM [No Focal Deficits] : no focal deficits [Normal Color/ Pigmentation] : normal color/ pigmentation [Oriented x3] : oriented x3 [Normal Affect] : normal affect [II] : Mallampati Class: II [TextBox_68] : I:E ratio 1:3; inspiratory and expiratory wheezes [TextBox_125] : several tattoos

## 2024-04-16 ENCOUNTER — APPOINTMENT (OUTPATIENT)
Dept: GASTROENTEROLOGY | Facility: CLINIC | Age: 29
End: 2024-04-16
Payer: MEDICAID

## 2024-04-16 VITALS
BODY MASS INDEX: 23.78 KG/M2 | HEIGHT: 66 IN | WEIGHT: 148 LBS | DIASTOLIC BLOOD PRESSURE: 70 MMHG | TEMPERATURE: 97.1 F | SYSTOLIC BLOOD PRESSURE: 122 MMHG | HEART RATE: 86 BPM | OXYGEN SATURATION: 99 %

## 2024-04-16 DIAGNOSIS — K50.90 CROHN'S DISEASE, UNSPECIFIED, W/OUT COMPLICATIONS: ICD-10-CM

## 2024-04-16 DIAGNOSIS — K64.4 RESIDUAL HEMORRHOIDAL SKIN TAGS: ICD-10-CM

## 2024-04-16 PROCEDURE — 99214 OFFICE O/P EST MOD 30 MIN: CPT

## 2024-04-16 RX ORDER — MESALAMINE 1.2 G/1
1.2 TABLET, DELAYED RELEASE ORAL
Qty: 360 | Refills: 3 | Status: ACTIVE | COMMUNITY
Start: 2022-12-13 | End: 1900-01-01

## 2024-04-16 RX ORDER — SODIUM PICOSULFATE, MAGNESIUM OXIDE, AND ANHYDROUS CITRIC ACID 12; 3.5; 1 G/175ML; G/175ML; MG/175ML
10-3.5-12 MG-GM LIQUID ORAL
Qty: 2 | Refills: 0 | Status: DISCONTINUED | COMMUNITY
Start: 2023-09-08 | End: 2024-04-16

## 2024-04-16 NOTE — HISTORY OF PRESENT ILLNESS
[FreeTextEntry1] : The patient has Crohn's colitis.  We reviewed the evaluations done since the patient's last visit on September 8, 2023.  Blood work from that time was normal other than a vitamin D level of 22.9.  The patient is now on vitamin D 50,000 units weekly.  The patient underwent EGD and colonoscopy on February 15, 2024.  EGD revealed evidence of mild gastritis in the antrum with biopsies showing 10 eosinophils per high-power field at the GE junction.  There were no other significant findings on endoscopic biopsies.  Colonoscopy was significant for active mild colitis in the rectum and tiny aphthous ulcers in the cecum.  Biopsies from the cecum were negative but there was evidence of chronic active colitis involving the sigmoid, rectosigmoid, and rectum.  The patient also has external hemorrhoids.  The patient was having 1 solid bowel movement a day without blood.  She stopped her Lialda for 2 weeks and started having a looser bowel movement daily.  She has seen a small amount of blood on the toilet paper for the past 2 days.  She just restarted the Lialda.  She also had slight abdominal pain which is already better.  The patient's weight is stable.  She denies heartburn, dysphagia, nausea, vomiting.   Note from 9/8/2023 - The patient has Crohn's colitis.  She is on the Lialda formulation of mesalamine 4.8 g a day.  Her fecal calprotectin was elevated at 232 on April 14, 2023.  Since then, the patient was on a 5-day course of prednisone in August for asthma.  She never took the ampicillin that was given to her for UTI but she denies any UTI symptoms including dysuria or hematuria.  She feels very well denying abdominal pain.  She reports 1 solid bowel movement a day without melena or bright red blood per rectum.  She denies heartburn, dysphagia, nausea, vomiting.  The patient's weight is stable.  The patient has not been admitted to the hospital in the past year and denies any cardiac issues.   Note from 4/17/2023 - The patient has Crohn's disease.  She is feeling better since she was last seen 4 days ago.  She is having 1 bowel movement a day that is solid and small.  The stools are no longer loose and there is no blood.  She still has a little bit of mucus.  Abdominal pain has resolved.  The patient's urinary symptoms have subsided, although they occur periodically.  The patient denies nausea, vomiting, heartburn, dysphagia.  We reviewed the evaluations done last week which revealed that the stool test were negative for infection.  Calprotectin is still pending.  Labs were normal including normal white blood cell count, C-reactive protein less than 3, and sed rate of 10.  The only abnormality was the urinalysis and urine culture which was consistent with an infection with Streptococcus agalactiae.   Note from 4/13/2023 - The patient has Crohn's colitis and has been on Lialda 4.8 g a day for 3 months.  Her blood work from her last visit on March 21, 2023 showed a ferritin of 10 with a 15% iron saturation and a low vitamin D of 23.7 but was otherwise normal.  6 days ago, the patient started seeing blood on the toilet paper and this is progressed to blood on the stool.  She is still moving her bowels once a day but the stools are loose with some blood and mucus.  She did not move her bowels today.  She also has been having bloating and gassiness along with epigastric pain.  She denies fever, nausea, vomiting.  She reports fatigue.  She states that her stool was dark today.  She has not had any recent travel or antibiotic use.  The patient has been having some dysuria as well.   Note from 3/21/2023 - The patient has Crohn's colitis with skip lesions in the colon.  She is on Lialda 4.8 g a day.  She had a normal MR enterography on March 9, 2023.  The patient is feeling well.  She has 1 bowel movement a day which is mostly solid.  There is no further bleeding.  She denies melena or abdominal pain.  She also notes significantly decreased bloating.  She denies heartburn, dysphagia, nausea, vomiting.  Of note, the patient has had a dry cough and congestion for the past year.  This is worse at night primarily with laying down.   Note from 12/13/2022 - We reviewed the evaluations done since the patient's last visit on October 18, 2022.  Blood work was normal from that day.  The patient underwent colonoscopy on November 22, 2022, which was significant for aphthous ulcers in the cecum, proximal transverse colon, proximal descending colon, and rectum.  Biopsies showed chronic active colitis with skip areas consistent with active inflammatory bowel disease.  This would be consistent with Crohn's disease.  At the time of colonoscopy, the patient was seeing blood in the stool and was having loose, mucousy stools.  Over the past 2 weeks, she has been doing better.  She reports solid stools every 2 days without melena or bright red blood per rectum.  She does get some nausea but denies abdominal pain.   Note from 10/18/2022 - The patient is seen for the first time since her last visit on December 2, 2020.  At that time, she had had mild right-sided colitis on colonoscopy.  The biopsies appear to be more likely acute will go chronicity needed to be evaluated.  She had a CT scan on December 15, 2020 which was negative other than a small left ovarian cyst.  She saw another gastroenterologist 1 year ago with rectal bleeding.  She saw Dr. Ramirez who reportedly performed a sigmoidoscopy which showed proctitis.  The patient was given mesalamine suppositories which she uses periodically.  She gets "flareups" where she has bright red blood on the toilet paper.  Her bowel movements go from a baseline of 1 solid bowel movement a day to incomplete bowel movements every other day.  She has left-sided pain and tightness during the flareups.  She denies melena.  These events occur a couple of times a year but last weeks at a time.  She has currently been having bleeding on the toilet paper for the past 2 weeks.  She denies diarrhea, heartburn, dysphagia.  The patient's weight is stable.  The patient has not been admitted to the hospital in the past year and denies any cardiac issues.   Note from 12/2/2020 - We reviewed the patient's ultrasound of the abdomen and pelvis performed on November 27, 2020.  The pelvic ultrasound revealed a left ovarian corpus luteal cyst and a tiny echogenic focus in the left ovary that was too small to characterize.  The patient continues to have intermittent left-sided abdominal pain which was worse 2 weeks ago.  She had been taking Citrucel daily but stopped this and states that her stools are now "bad".  She moves her bowels every 2 to 3 days and strains.  She is seeing blood with every bowel movement on the toilet paper and a little bit in the bowl.  She denies melena.  She denies fevers.   Note from 11/4/2020 - Patient is feeling somewhat better.  She has been using Citrucel daily and is moving her bowels every 1 to 2 days, sometimes straining.  She sees bright red blood on the toilet paper only with harder stools.  She denies melena.  She notes that she has less left lower quadrant abdominal pain although it has not resolved.  She also notes that bloating and nausea have improved.  Blood work and urine testing from her last visit on October 21, 2020 were normal.  The patient has not yet had the abdominal and transvaginal ultrasound, which is scheduled for next week.  She has a history of right-sided colitis in the colon and was found to have an anal fissure at her last visit.   Note from 10/21/2020 - The patient was last seen on August 25 at which time her symptoms had resolved.  She had had findings of acute colitis in the right colon.  Blood work at that time was normal.  She was well until 1-1/2 to 2 weeks ago when she states that her symptoms have started to return.  She now has mild left-sided abdominal pain. She has 1 solid bowel movement a day and does see some blood on the toilet paper.  She denies melena.  She denies heartburn or dysphagia.  She has also recently developed bloating and nausea after eating.  She has had mild dysuria but denies hematuria.  She denies rectal pain, burning, itching.  The patient now tells me that she had abdominal and pelvic transvaginal ultrasound on June 5, 2020.  I reviewed the reports.  The abdominal ultrasound was normal although the gallbladder was under distended and therefore evaluation was limited.  The pelvic ultrasound was significant for a mild to moderate amount of fluid in the left adnexal region which could be due to a recently ruptured cyst.   Note from 8/25/2020 - We reviewed the patient's colonoscopy performed on July 7, 2020.  Nonspecific punctate inflammation was seen in the right colon with biopsy showing acute and chronic inflammation, acute cryptitis, and crypt abscesses.  There was no evidence of chronicity and this appeared to be more consistent with an acute self-limited colitis than chronic inflammatory bowel disease.  The patient is feeling better with resolution of her abdominal pain.  She is having solid bowel movements 5-6 times a week.  She does see intermittent blood on the toilet paper.  She denies melena.   Note from 6/18/2020 - The patient is a 25-year-old woman who was well until 2 months ago, when she developed rectal bleeding and constipation.  She states that she began to go 1 to 2 days without a bowel movement and was straining to pass her stools.  She has noted red blood streaking on the stool and on the toilet paper.  She notes that this was during the COVID-19 isolation, when she had a significant change in her exercise and her diet.  She is a  and had significant reduction in her physical activity.  She also had been a vegan but began to eat fish and eggs.  Ever since then, the patient has had blood with every bowel movement.  The patient also notes that for the past month, she has had left-sided abdominal pain which is intermittent.  She denies heartburn, dysphasia, nausea, vomiting.  The patient's weight is stable.  She was put on a stool softener and prune juice initially which did not help her.  She saw a gastroenterologist 1 week ago who recommended a high-fiber diet and placed her on Citrucel 2 tablespoons twice daily.  She has been moving her bowels 1-2 times a day but still had blood on all her bowel movements until today.  The patient's pain has lessened this week.   The patient has not been admitted to the hospital in the past year and denies any cardiac issues.

## 2024-04-16 NOTE — ASSESSMENT
[FreeTextEntry1] : Patient with Crohn's colitis who admits to not being fully compliant with Lialda.  She is on Lialda formulation of mesalamine 4.8 g a day since December 2022.  There was evidence of chronic active colitis in the sigmoid, rectosigmoid, and rectum on colonoscopy.  I had a long discussion with the patient regarding our options of treatment.  We discussed Biologics but also discussed giving mesalamine more of a chance to work.  I stressed that if we were to stick with mesalamine, compliance is extremely important and she would need to take the medicine on a daily basis without exception.  The patient wishes to go this route.  We will therefore continue mesalamine 4.8 g a day and reassess with a colonoscopy in February 2025.  The patient will return to see me in 3 months at which time we will assess blood work and fecal calprotectin.   Plan from 9/8/2023 - Patient with Crohn's colitis who is doing very well clinically on Lialda 4.8 g a day.  Bloodwork was sent for CBC, Chem-darren, TSH, ESR, C-reactive protein, iron studies, B12, folate, vitamin D, hepatitis B and C serologies, quantiferon gold.  A colonoscopy has been scheduled. The risks, benefits, alternatives, and limitations of the procedure, including the possibility of missed lesions, were explained.   Plan from 4/17/2023 - Patient with Crohn's colitis who symptoms have improved.  There is no suggestion of inflammatory disease on blood work and the stool tests were negative for infection.  She has a suggestion of a Streptococcus agalactiae infection in the urine.  Given the periodic symptoms, we will treat the UTI with ampicillin 500 mg 4 times daily for 7 days.  We are still awaiting the stool calprotectin.  Patient will continue mesalamine.  She will call me if she has any worsening of her symptoms.  Otherwise, the patient will return to see me in 3 to 4 months.  Patient is due for colonoscopy in November 2023.   Plan from 4/13/2023 - Patient with Crohn's colitis who may be having a mild flare with some bleeding and looser stool along with bloating and epigastric pain.  Her stool is currently guaiac negative.  Bloodwork will be sent for CBC, Chem-darren, TSH, ESR, C-reactive protein, iron studies, B12, folate, hepatitis B and C serologies, quantiferon gold.  Stool studies will be sent for a GI infectious PCR panel and C. diff by PCR and fecal calprotectin.  Urine will be sent for UA and C&S.  We will follow-up with a telephonic visit on Monday.  If she is having any worsening symptoms without infection, we will consider prednisone.  Patient is due for colonoscopy in November 2023.   Plan from 3/21/2023 - Patient with Crohn's colitis with skip areas.  MR enterography was normal.  She is doing well on Lialda 4.8 g a day.  She has a dry cough for the past year.  Bloodwork was sent for CBC, Chem-darren, TSH, ESR, C-reactive protein, iron studies, B12, folate, vitamin D, amylase, lipase.  Patient will continue Lialda (mesalamine) 4.8 g a day.  Patient was advised to see an ENT doctor for evaluation for possible postnasal drip.  If the evaluation is unrevealing or suggestive of reflux and she has continued symptoms, we will consider reflux as an etiology.  Patient will return to see me in 6 months.  She is due for colonoscopy in November 2023.   Plan from 12/13/2022 - Patient with aphthous ulcers in patches throughout the colon with skip areas.  Biopsies revealed chronic active inflammatory bowel disease with skip lesions consistent with Crohn's disease.  The patient has had only mild symptoms.  I had a long discussion with the patient regarding the diagnosis.  She has had findings of colitis/proctitis dating back to 2020.  This is consistent with chronic inflammatory bowel disease, Crohn's type.  I have started the patient on the Lialda formulation of mesalamine 4.8 g a day.  The patient was sent for an MR enterography to rule out any small bowel disease.  We will repeat a colonoscopy in 1 year that is November 2023.  Patient will return to see me in 3 months.   Plan from 10/18/2022 - Patient currently with bright red blood on the toilet paper and left-sided abdominal pain.  She has a history of mild right-sided colitis seen on colonoscopy in 2020.  She reportedly had a sigmoidoscopy last year that showed proctitis.  The patient requires a full evaluation to consider inflammatory bowel disease.  A colonoscopy has been scheduled. The risks, benefits, alternatives, and limitations of the procedure, including the possibility of missed lesions, were explained.  Blood work was sent for CBC, iron studies, sed rate, C-reactive protein.   Plan from 12/2/2020 - Patient with ongoing complaints of left-sided abdominal pain.  She is mildly tender in the left lower quadrant.  Abdominal ultrasound was normal but pelvic ultrasound showed a left ovarian corpus luteal cyst and a very tiny focus in the left ovary that was too small to characterize.  Patient was advised to follow-up with her gynecologist regarding the ovarian findings.  Patient was sent for CT scan of the abdomen and pelvis given her ongoing pain and tenderness.  We need to rule out diverticulitis and colitis, especially as the patient had evidence of right-sided colitis on colonoscopy.  Patient was advised that she should be using the Citrucel daily and drinking at least 64 ounces of water a day to keep her bowel movements more regular, which could potentially help her pain.  We will repeat a colonoscopy in July 2021 to follow-up the finding of colitis to see if this is a chronic condition.   Plan from 11/4/2020 - Patient with right-sided colitis on colonoscopy who notes improvement but not resolution of her left lower quadrant pain and rectal bleeding.  Her bloating and nausea have improved.  She had an anal fissure with a last visit 2 weeks ago.  Patient was advised to continue using Citrucel daily.  She admits to not drinking enough water and was advised to drink at least 64 ounces of water a day.  Patient will proceed with abdominal and transvaginal ultrasound, scheduled for next week.  We will observe her symptoms.  The patient will return to see me in 1 month but will call me if anything worsens in the interim.  We will repeat a colonoscopy in July 2021.   Plan from 10/21/2020 - Patient with symptoms of left-sided abdominal pain, postprandial bloating and nausea, and mild rectal bleeding.  An anal fissure was found on exam.  The patient did have evidence of acute right sided colitis on previous colonoscopy but this does not seem to be the cause of the patient's symptoms.  The patient appears to have had a ruptured ovarian cyst back in June and it is possible that this is happening again.  Patient was sent for repeat abdominal and transvaginal pelvic ultrasound.  Blood work was sent for CBC, celiac markers, hCG.  Urine was sent for UA and C&S.  Patient was advised to use Citrucel daily to help with the passage of bowels and healing of the anal fissure.  Patient will return to see me in 2 weeks.  If she has ongoing bloating and nausea, she may need EGD.  The plan is to repeat colonoscopy in July 2021.   Plan from 8/25/2020 - Patient with evidence of colitis in the right colon on colonoscopy.  Pathology was more consistent with an acute self-limited colitis rather than inflammatory bowel disease.  The patient's symptoms appear improved.  We discussed the significance of these findings.  The patient's symptoms had lasted quite a while and the cause is unclear.  Blood work was sent for CBC, CHEM pack, sed rate, C-reactive protein, and IBD serologies.  We will repeat a colonoscopy in 1 year that is July 2021 2 rule out a chronic colitis.  Patient was advised to use Citrucel daily and increase water intake to help with her bowel movements.  She will call me if her symptoms change or worsen in any way.  Otherwise she will return to see me next year.   Plan from 6/18/2020 - Patient with new onset constipation and rectal bleeding with associated left-sided abdominal pain.  This appears to correspond to the patient's change in diet with less fiber and decreased physical activity.  The patient is concerned as she has colon cancer in her family.  I advised the patient that she should continue Citrucel and follow a high-fiber diet.  She was also encouraged to increase her fluid intake.  A colonoscopy has been scheduled. The risks, benefits, alternatives, and limitations of the procedure, including the possibility of missed lesions, were explained.

## 2024-04-16 NOTE — CONSULT LETTER
[FreeTextEntry1] : Dear Dr. Jeanie Jones,\par  \par  I had the pleasure of seeing your patient ADARSH SAMUEL in the office today.  My office note is attached. PLEASE READ THE "ASSESSMENT" SECTION OF THE NOTE TO SEE MY IMPRESSION AND PLAN.\par  \par  Thank you very much for allowing me to participate in the care of your patient.\par  \par  Sincerely,\par  \par  Ke Peguero M.D., FACG, FACP\par  Director, Celiac Program at [facility]\par   of Medicine, [employer] at [facility]  Adjunct  of Medicine, [facility]  Practice Director, [facility] - Gastroenterology at [place]\[facility]  [address]\[place] [address]\par  Tel: [phone]\par  Email: [email or web address]\par  \par  \par  The attached note has been created using a voice recognition system (Dragon).  There may be some misspellings and typos.  Please call my office if you have any issues or questions.

## 2024-04-22 ENCOUNTER — TRANSCRIPTION ENCOUNTER (OUTPATIENT)
Age: 29
End: 2024-04-22

## 2024-04-30 ENCOUNTER — LABORATORY RESULT (OUTPATIENT)
Age: 29
End: 2024-04-30

## 2024-04-30 ENCOUNTER — APPOINTMENT (OUTPATIENT)
Dept: PULMONOLOGY | Facility: CLINIC | Age: 29
End: 2024-04-30
Payer: MEDICAID

## 2024-04-30 VITALS
WEIGHT: 150 LBS | DIASTOLIC BLOOD PRESSURE: 80 MMHG | OXYGEN SATURATION: 98 % | HEART RATE: 85 BPM | SYSTOLIC BLOOD PRESSURE: 132 MMHG | HEIGHT: 66 IN | BODY MASS INDEX: 24.11 KG/M2 | RESPIRATION RATE: 16 BRPM

## 2024-04-30 DIAGNOSIS — J45.40 MODERATE PERSISTENT ASTHMA, UNCOMPLICATED: ICD-10-CM

## 2024-04-30 DIAGNOSIS — J45.50 SEVERE PERSISTENT ASTHMA, UNCOMPLICATED: ICD-10-CM

## 2024-04-30 DIAGNOSIS — J45.909 UNSPECIFIED ASTHMA, UNCOMPLICATED: ICD-10-CM

## 2024-04-30 DIAGNOSIS — J82.83 EOSINOPHILIC ASTHMA: ICD-10-CM

## 2024-04-30 DIAGNOSIS — R06.02 SHORTNESS OF BREATH: ICD-10-CM

## 2024-04-30 PROCEDURE — 95012 NITRIC OXIDE EXP GAS DETER: CPT

## 2024-04-30 PROCEDURE — 99214 OFFICE O/P EST MOD 30 MIN: CPT | Mod: 25

## 2024-04-30 RX ORDER — PREDNISONE 10 MG/1
10 TABLET ORAL
Qty: 50 | Refills: 0 | Status: ACTIVE | COMMUNITY
Start: 2024-02-27 | End: 1900-01-01

## 2024-04-30 NOTE — PHYSICAL EXAM
[No Acute Distress] : no acute distress [Normal Oropharynx] : normal oropharynx [II] : Mallampati Class: II [Normal Appearance] : normal appearance [No Neck Mass] : no neck mass [Normal Rate/Rhythm] : normal rate/rhythm [Normal S1, S2] : normal s1, s2 [No Murmurs] : no murmurs [No Resp Distress] : no resp distress [No Abnormalities] : no abnormalities [Benign] : benign [Normal Gait] : normal gait [No Clubbing] : no clubbing [No Cyanosis] : no cyanosis [No Edema] : no edema [FROM] : FROM [Normal Color/ Pigmentation] : normal color/ pigmentation [No Focal Deficits] : no focal deficits [Oriented x3] : oriented x3 [Normal Affect] : normal affect

## 2024-05-01 DIAGNOSIS — R89.9 UNSPECIFIED ABNORMAL FINDING IN SPECIMENS FROM OTHER ORGANS, SYSTEMS AND TISSUES: ICD-10-CM

## 2024-05-02 ENCOUNTER — TRANSCRIPTION ENCOUNTER (OUTPATIENT)
Age: 29
End: 2024-05-02

## 2024-05-02 LAB — STRONGYLOIDES AB SER IA-ACNC: NEGATIVE

## 2024-05-02 RX ORDER — TIOTROPIUM BROMIDE INHALATION SPRAY 3.12 UG/1
2.5 SPRAY, METERED RESPIRATORY (INHALATION) DAILY
Qty: 3 | Refills: 1 | Status: ACTIVE | COMMUNITY
Start: 2024-04-30 | End: 1900-01-01

## 2024-05-02 RX ORDER — DESLORATADINE 5 MG/1
5 TABLET ORAL
Qty: 90 | Refills: 1 | Status: ACTIVE | COMMUNITY
Start: 2024-05-02 | End: 1900-01-01

## 2024-05-03 LAB — STRONGYLOIDES AB SER IA-ACNC: NEGATIVE

## 2024-06-04 RX ORDER — FLUTICASONE FUROATE AND VILANTEROL TRIFENATATE 200; 25 UG/1; UG/1
200-25 POWDER RESPIRATORY (INHALATION)
Qty: 3 | Refills: 1 | Status: ACTIVE | COMMUNITY
Start: 2023-10-12 | End: 1900-01-01

## 2024-07-16 ENCOUNTER — APPOINTMENT (OUTPATIENT)
Dept: GASTROENTEROLOGY | Facility: CLINIC | Age: 29
End: 2024-07-16

## 2024-08-14 ENCOUNTER — APPOINTMENT (OUTPATIENT)
Dept: PULMONOLOGY | Facility: CLINIC | Age: 29
End: 2024-08-14

## 2024-10-11 RX ORDER — BUDESONIDE AND FORMOTEROL FUMARATE DIHYDRATE 160; 4.5 UG/1; UG/1
160-4.5 AEROSOL RESPIRATORY (INHALATION) TWICE DAILY
Qty: 1 | Refills: 3 | Status: ACTIVE | COMMUNITY
Start: 2024-10-11 | End: 1900-01-01

## 2024-10-22 ENCOUNTER — APPOINTMENT (OUTPATIENT)
Dept: PULMONOLOGY | Facility: CLINIC | Age: 29
End: 2024-10-22

## 2024-10-31 ENCOUNTER — APPOINTMENT (OUTPATIENT)
Dept: GASTROENTEROLOGY | Facility: CLINIC | Age: 29
End: 2024-10-31
Payer: MEDICAID

## 2024-10-31 VITALS
HEIGHT: 66 IN | WEIGHT: 148 LBS | TEMPERATURE: 98.2 F | SYSTOLIC BLOOD PRESSURE: 120 MMHG | DIASTOLIC BLOOD PRESSURE: 70 MMHG | HEART RATE: 104 BPM | BODY MASS INDEX: 23.78 KG/M2 | OXYGEN SATURATION: 98 %

## 2024-10-31 DIAGNOSIS — K50.90 CROHN'S DISEASE, UNSPECIFIED, W/OUT COMPLICATIONS: ICD-10-CM

## 2024-10-31 PROCEDURE — 99214 OFFICE O/P EST MOD 30 MIN: CPT | Mod: 25

## 2024-10-31 RX ORDER — SODIUM PICOSULFATE, MAGNESIUM OXIDE, AND ANHYDROUS CITRIC ACID 12; 3.5; 1 G/175ML; G/175ML; MG/175ML
10-3.5-12 MG-GM LIQUID ORAL
Qty: 2 | Refills: 0 | Status: ACTIVE | COMMUNITY
Start: 2024-10-31 | End: 1900-01-01

## 2024-11-01 LAB
25(OH)D3 SERPL-MCNC: 20.7 NG/ML
ALBUMIN SERPL ELPH-MCNC: 4.8 G/DL
ALP BLD-CCNC: 74 U/L
ALT SERPL-CCNC: 14 U/L
ANION GAP SERPL CALC-SCNC: 12 MMOL/L
AST SERPL-CCNC: 15 U/L
BASOPHILS # BLD AUTO: 0.05 K/UL
BASOPHILS NFR BLD AUTO: 0.7 %
BILIRUB SERPL-MCNC: 0.4 MG/DL
BUN SERPL-MCNC: 8 MG/DL
CALCIUM SERPL-MCNC: 9.3 MG/DL
CHLORIDE SERPL-SCNC: 103 MMOL/L
CO2 SERPL-SCNC: 24 MMOL/L
CREAT SERPL-MCNC: 0.62 MG/DL
CRP SERPL-MCNC: 12 MG/L
EGFR: 124 ML/MIN/1.73M2
EOSINOPHIL # BLD AUTO: 0.55 K/UL
EOSINOPHIL NFR BLD AUTO: 7.5 %
ERYTHROCYTE [SEDIMENTATION RATE] IN BLOOD BY WESTERGREN METHOD: 12 MM/HR
FERRITIN SERPL-MCNC: 32 NG/ML
FOLATE SERPL-MCNC: 10.8 NG/ML
GGT SERPL-CCNC: 10 U/L
GLUCOSE SERPL-MCNC: 91 MG/DL
HCT VFR BLD CALC: 41.9 %
HGB BLD-MCNC: 13.2 G/DL
IMM GRANULOCYTES NFR BLD AUTO: 0.4 %
IRON SATN MFR SERPL: 11 %
IRON SERPL-MCNC: 38 UG/DL
LYMPHOCYTES # BLD AUTO: 0.82 K/UL
LYMPHOCYTES NFR BLD AUTO: 11.1 %
MAN DIFF?: NORMAL
MCHC RBC-ENTMCNC: 28.4 PG
MCHC RBC-ENTMCNC: 31.5 G/DL
MCV RBC AUTO: 90.3 FL
MONOCYTES # BLD AUTO: 0.67 K/UL
MONOCYTES NFR BLD AUTO: 9.1 %
NEUTROPHILS # BLD AUTO: 5.25 K/UL
NEUTROPHILS NFR BLD AUTO: 71.2 %
PLATELET # BLD AUTO: 278 K/UL
POTASSIUM SERPL-SCNC: 4.3 MMOL/L
PROT SERPL-MCNC: 7.5 G/DL
RBC # BLD: 4.64 M/UL
RBC # FLD: 13.7 %
SODIUM SERPL-SCNC: 138 MMOL/L
TIBC SERPL-MCNC: 357 UG/DL
TSH SERPL-ACNC: 1.14 UIU/ML
UIBC SERPL-MCNC: 319 UG/DL
VIT B12 SERPL-MCNC: 659 PG/ML
WBC # FLD AUTO: 7.37 K/UL

## 2025-03-04 ENCOUNTER — APPOINTMENT (OUTPATIENT)
Dept: GASTROENTEROLOGY | Facility: AMBULATORY MEDICAL SERVICES | Age: 30
End: 2025-03-04

## 2025-03-22 ENCOUNTER — NON-APPOINTMENT (OUTPATIENT)
Age: 30
End: 2025-03-22